# Patient Record
Sex: MALE | Race: WHITE | Employment: OTHER | URBAN - NONMETROPOLITAN AREA
[De-identification: names, ages, dates, MRNs, and addresses within clinical notes are randomized per-mention and may not be internally consistent; named-entity substitution may affect disease eponyms.]

---

## 2019-09-16 ENCOUNTER — HOSPITAL ENCOUNTER (EMERGENCY)
Age: 69
Discharge: HOME | End: 2019-09-16
Payer: MEDICARE

## 2019-09-16 ENCOUNTER — APPOINTMENT (OUTPATIENT)
Dept: CT IMAGING | Age: 69
End: 2019-09-16
Payer: MEDICARE

## 2019-09-16 VITALS
TEMPERATURE: 98.3 F | HEIGHT: 69 IN | HEART RATE: 74 BPM | WEIGHT: 170 LBS | OXYGEN SATURATION: 97 % | SYSTOLIC BLOOD PRESSURE: 162 MMHG | RESPIRATION RATE: 17 BRPM | DIASTOLIC BLOOD PRESSURE: 95 MMHG | BODY MASS INDEX: 25.18 KG/M2

## 2019-09-16 DIAGNOSIS — K04.7 DENTAL ABSCESS: Primary | ICD-10-CM

## 2019-09-16 DIAGNOSIS — E87.1 HYPONATREMIA: ICD-10-CM

## 2019-09-16 LAB
ANION GAP SERPL CALCULATED.3IONS-SCNC: 16 MEQ/L (ref 8–16)
BASOPHILS # BLD: 0.5 %
BASOPHILS ABSOLUTE: 0 THOU/MM3 (ref 0–0.1)
BUN BLDV-MCNC: 10 MG/DL (ref 7–22)
CALCIUM SERPL-MCNC: 9.8 MG/DL (ref 8.5–10.5)
CHLORIDE BLD-SCNC: 87 MEQ/L (ref 98–111)
CO2: 26 MEQ/L (ref 23–33)
CREAT SERPL-MCNC: 0.9 MG/DL (ref 0.4–1.2)
EOSINOPHIL # BLD: 0.2 %
EOSINOPHILS ABSOLUTE: 0 THOU/MM3 (ref 0–0.4)
ERYTHROCYTE [DISTWIDTH] IN BLOOD BY AUTOMATED COUNT: 12.4 % (ref 11.5–14.5)
ERYTHROCYTE [DISTWIDTH] IN BLOOD BY AUTOMATED COUNT: 43.5 FL (ref 35–45)
GFR SERPL CREATININE-BSD FRML MDRD: 83 ML/MIN/1.73M2
GLUCOSE BLD-MCNC: 109 MG/DL (ref 70–108)
HCT VFR BLD CALC: 42 % (ref 42–52)
HEMOGLOBIN: 14.6 GM/DL (ref 14–18)
IMMATURE GRANS (ABS): 0.11 THOU/MM3 (ref 0–0.07)
IMMATURE GRANULOCYTES: 1 %
LYMPHOCYTES # BLD: 15.1 %
LYMPHOCYTES ABSOLUTE: 1.5 THOU/MM3 (ref 1–4.8)
MCH RBC QN AUTO: 33.4 PG (ref 26–33)
MCHC RBC AUTO-ENTMCNC: 34.8 GM/DL (ref 32.2–35.5)
MCV RBC AUTO: 96.1 FL (ref 80–94)
MONOCYTES # BLD: 7.8 %
MONOCYTES ABSOLUTE: 0.8 THOU/MM3 (ref 0.4–1.3)
NUCLEATED RED BLOOD CELLS: 0 /100 WBC
OSMOLALITY CALCULATION: 258.6 MOSMOL/KG (ref 275–300)
PLATELET # BLD: 426 THOU/MM3 (ref 130–400)
PMV BLD AUTO: 8.2 FL (ref 9.4–12.4)
POTASSIUM SERPL-SCNC: 3.4 MEQ/L (ref 3.5–5.2)
RBC # BLD: 4.37 MILL/MM3 (ref 4.7–6.1)
SEG NEUTROPHILS: 75.3 %
SEGMENTED NEUTROPHILS ABSOLUTE COUNT: 7.5 THOU/MM3 (ref 1.8–7.7)
SODIUM BLD-SCNC: 129 MEQ/L (ref 135–145)
WBC # BLD: 9.9 THOU/MM3 (ref 4.8–10.8)

## 2019-09-16 PROCEDURE — 70487 CT MAXILLOFACIAL W/DYE: CPT

## 2019-09-16 PROCEDURE — 36415 COLL VENOUS BLD VENIPUNCTURE: CPT

## 2019-09-16 PROCEDURE — 80048 BASIC METABOLIC PNL TOTAL CA: CPT

## 2019-09-16 PROCEDURE — 99284 EMERGENCY DEPT VISIT MOD MDM: CPT

## 2019-09-16 PROCEDURE — 2580000003 HC RX 258: Performed by: PHYSICIAN ASSISTANT

## 2019-09-16 PROCEDURE — 2500000003 HC RX 250 WO HCPCS: Performed by: PHYSICIAN ASSISTANT

## 2019-09-16 PROCEDURE — 87205 SMEAR GRAM STAIN: CPT

## 2019-09-16 PROCEDURE — 6360000004 HC RX CONTRAST MEDICATION: Performed by: PHYSICIAN ASSISTANT

## 2019-09-16 PROCEDURE — 87040 BLOOD CULTURE FOR BACTERIA: CPT

## 2019-09-16 PROCEDURE — 87070 CULTURE OTHR SPECIMN AEROBIC: CPT

## 2019-09-16 PROCEDURE — 6370000000 HC RX 637 (ALT 250 FOR IP): Performed by: PHYSICIAN ASSISTANT

## 2019-09-16 PROCEDURE — 85025 COMPLETE CBC W/AUTO DIFF WBC: CPT

## 2019-09-16 PROCEDURE — 96365 THER/PROPH/DIAG IV INF INIT: CPT

## 2019-09-16 PROCEDURE — 87075 CULTR BACTERIA EXCEPT BLOOD: CPT

## 2019-09-16 RX ORDER — CHLORHEXIDINE GLUCONATE 0.12 MG/ML
15 RINSE ORAL 2 TIMES DAILY
Qty: 420 ML | Refills: 0 | Status: SHIPPED | OUTPATIENT
Start: 2019-09-16 | End: 2019-09-30

## 2019-09-16 RX ORDER — CLINDAMYCIN PHOSPHATE 600 MG/50ML
600 INJECTION INTRAVENOUS ONCE
Status: COMPLETED | OUTPATIENT
Start: 2019-09-16 | End: 2019-09-16

## 2019-09-16 RX ORDER — HYDROCODONE BITARTRATE AND ACETAMINOPHEN 5; 325 MG/1; MG/1
1 TABLET ORAL EVERY 6 HOURS PRN
Qty: 8 TABLET | Refills: 0 | Status: SHIPPED | OUTPATIENT
Start: 2019-09-16 | End: 2019-09-18

## 2019-09-16 RX ORDER — CLINDAMYCIN HYDROCHLORIDE 150 MG/1
450 CAPSULE ORAL 3 TIMES DAILY
Qty: 90 CAPSULE | Refills: 0 | Status: SHIPPED | OUTPATIENT
Start: 2019-09-16 | End: 2019-09-26

## 2019-09-16 RX ORDER — HYDROCODONE BITARTRATE AND ACETAMINOPHEN 5; 325 MG/1; MG/1
1 TABLET ORAL ONCE
Status: COMPLETED | OUTPATIENT
Start: 2019-09-16 | End: 2019-09-16

## 2019-09-16 RX ORDER — 0.9 % SODIUM CHLORIDE 0.9 %
500 INTRAVENOUS SOLUTION INTRAVENOUS ONCE
Status: COMPLETED | OUTPATIENT
Start: 2019-09-16 | End: 2019-09-16

## 2019-09-16 RX ADMIN — HYDROCODONE BITARTRATE AND ACETAMINOPHEN 1 TABLET: 5; 325 TABLET ORAL at 13:15

## 2019-09-16 RX ADMIN — IOPAMIDOL 80 ML: 755 INJECTION, SOLUTION INTRAVENOUS at 13:59

## 2019-09-16 RX ADMIN — CLINDAMYCIN PHOSPHATE 600 MG: 600 INJECTION, SOLUTION INTRAVENOUS at 15:43

## 2019-09-16 RX ADMIN — SODIUM CHLORIDE 500 ML: 9 INJECTION, SOLUTION INTRAVENOUS at 13:20

## 2019-09-16 ASSESSMENT — PAIN SCALES - GENERAL
PAINLEVEL_OUTOF10: 10
PAINLEVEL_OUTOF10: 10
PAINLEVEL_OUTOF10: 7

## 2019-09-16 ASSESSMENT — PAIN SCALES - WONG BAKER: WONGBAKER_NUMERICALRESPONSE: 10

## 2019-09-16 ASSESSMENT — PAIN DESCRIPTION - ORIENTATION
ORIENTATION: RIGHT
ORIENTATION: RIGHT

## 2019-09-16 ASSESSMENT — PAIN DESCRIPTION - DESCRIPTORS
DESCRIPTORS: THROBBING
DESCRIPTORS: THROBBING

## 2019-09-16 ASSESSMENT — PAIN DESCRIPTION - PAIN TYPE
TYPE: ACUTE PAIN
TYPE: ACUTE PAIN

## 2019-09-16 ASSESSMENT — PAIN DESCRIPTION - ONSET: ONSET: PROGRESSIVE

## 2019-09-16 ASSESSMENT — PAIN DESCRIPTION - LOCATION
LOCATION: TEETH
LOCATION: TEETH;MOUTH

## 2019-09-16 ASSESSMENT — ENCOUNTER SYMPTOMS
VOMITING: 0
NAUSEA: 0
SHORTNESS OF BREATH: 0
VOICE CHANGE: 0
CHEST TIGHTNESS: 0

## 2019-09-16 ASSESSMENT — PAIN DESCRIPTION - FREQUENCY
FREQUENCY: CONTINUOUS
FREQUENCY: CONTINUOUS

## 2019-09-16 ASSESSMENT — PAIN DESCRIPTION - PROGRESSION: CLINICAL_PROGRESSION: GRADUALLY WORSENING

## 2019-09-16 NOTE — ED PROVIDER NOTES
Discharge Medication List as of 9/16/2019  4:26 PM          ALLERGIES     is allergic to penicillins. FAMILY HISTORY     has no family status information on file. family history is not on file. SOCIAL HISTORY      reports that he has never smoked. He has never used smokeless tobacco. He reports that he drinks about 4.0 - 6.0 standard drinks of alcohol per week. He reports that he has current or past drug history. Drug: Marijuana. PHYSICAL EXAM     INITIAL VITALS:  height is 5' 9\" (1.753 m) and weight is 170 lb (77.1 kg). His oral temperature is 98.3 °F (36.8 °C). His blood pressure is 162/95 (abnormal) and his pulse is 74. His respiration is 17 and oxygen saturation is 97%. Physical Exam   Constitutional: He is oriented to person, place, and time. He appears well-developed and well-nourished. No distress. HENT:   Head: Normocephalic and atraumatic. Right Ear: External ear normal.   Left Ear: External ear normal.   Mouth/Throat: Oropharynx is clear and moist. He has dentures. No trismus in the jaw. Dental abscesses and dental caries present. Patients has only two permanant teeth remaining on the right lower jaw that parallel an open pocket with purulent drainage. There is a foul smell but no obvious tenderness to palpation of the jawline. No submandibular tenderness or swelling. Eyes: Pupils are equal, round, and reactive to light. Conjunctivae and EOM are normal.   Neck: Normal range of motion. No spinous process tenderness and no muscular tenderness present. Cardiovascular: Normal rate, regular rhythm and normal heart sounds. Pulmonary/Chest: Effort normal and breath sounds normal.   Neurological: He is alert and oriented to person, place, and time. Skin: Skin is warm and dry. He is not diaphoretic. Psychiatric: He has a normal mood and affect. His behavior is normal. Judgment and thought content normal.   Nursing note and vitals reviewed.       DIFFERENTIAL DIAGNOSIS: Differential diagnoses are discussed    DIAGNOSTIC RESULTS     EKG: All EKG's are interpreted by the Emergency Department Physician who either signs or Co-signsthis chart in the absence of a cardiologist.        RADIOLOGY: non-plain film images(s) such as CT, Ultrasound and MRI are read by the radiologist.    Saturnino Lund   Final Result   1. Patient is almost totally edentulous. 2. Findings of dental abscesses involving right and left hemimandible anteriorly with adjacent soft tissue swelling, likely cellulitis/phlegmon. A well-defined or drainable soft tissue abscess is not identified            **This report has been created using voice recognition software. It may contain minor errors which are inherent in voice recognition technology. **      Final report electronically signed by Dr. Courtney Davila on 9/16/2019 2:15 PM          LABS:      Labs Reviewed   CBC WITH AUTO DIFFERENTIAL - Abnormal; Notable for the following components:       Result Value    RBC 4.37 (*)     MCV 96.1 (*)     MCH 33.4 (*)     Platelets 977 (*)     MPV 8.2 (*)     Immature Grans (Abs) 0.11 (*)     All other components within normal limits   BASIC METABOLIC PANEL - Abnormal; Notable for the following components:    Sodium 129 (*)     Potassium 3.4 (*)     Chloride 87 (*)     Glucose 109 (*)     All other components within normal limits   GLOMERULAR FILTRATION RATE, ESTIMATED - Abnormal; Notable for the following components:    Est, Glom Filt Rate 83 (*)     All other components within normal limits   OSMOLALITY - Abnormal; Notable for the following components:    Osmolality Calc 258.6 (*)     All other components within normal limits   ANAEROBIC AND AEROBIC CULTURE    Narrative:     Source: mouth       Site: swab          Current Antibiotics: none   CULTURE BLOOD #1   CULTURE BLOOD #2   ANION GAP       EMERGENCY DEPARTMENT COURSE:   Vitals:    Vitals:    09/16/19 1209 09/16/19 1321 09/16/19 1530   BP: (!) 161/85 (!) 166/97 (!) 162/95   Pulse: 83 72 74   Resp:  18 17   Temp: 98.3 °F (36.8 °C)     TempSrc: Oral     SpO2:  97% 97%   Weight: 170 lb (77.1 kg)     Height: 5' 9\" (1.753 m)        12:42 PM: The patient was seen and evaluated. Patient presents after referral from his dentist for oral injury and infection. He reports fall 3 days ago and pain since. Denies any systemic symptoms. He reports improvement in pain after Norco in the department. Labs are reassuring. Exam is consistent with ruptured abscess, CT ordered to rule out any associated dental fracture. This reveals that the patient has dental abscess involving the right and left hemimandible anteriorly with adjacent soft tissue swelling, likely cellulitis/phlegmon. No well-defined drainable soft tissue abscess identified. He was given IV clindamycin in the department. Labs did reveal some hyponatremia. After discussion with the patient, he does typically try to avoid salt. He complains of no symptoms associated with hyponatremia. He will increase his p.o. salt intake and agrees to follow with PCP for monitoring of the sodium level. Results discussed with patient. He has no oral pharyngeal compromise and airway is patent. No systemic symptoms. I feel he is stable for discharge and outpatient follow-up. Return precautions discussed. Dietary adjustments for the open mucosal injury discussed, I do not feel that surgical repair is indicated 3 days out from the injury and with active purulent drainage. Discussed with attending provider who agrees. CRITICAL CARE:   None    CONSULTS:  None    PROCEDURES:  None    FINAL IMPRESSION      1. Dental abscess    2. Hyponatremia          DISPOSITION/PLAN   Discharge    PATIENT REFERRED TO:  [id number] [place] [address].   [place] [address]  [phone]  Call in 1 day      Your dentist    In 1 week      [facility] [address] [address] EMERGENCY DEPT  [address] [place] [address]  [phone]    If symptoms worsen      DISCHARGEMEDICATIONS:  Discharge Medication List as of 9/16/2019  4:26 PM      START taking these medications    Details   clindamycin (CLEOCIN) 150 MG capsule Take 3 capsules by mouth 3 times daily for 10 days, Disp-90 capsule, R-0Print      chlorhexidine (PERIDEX) 0.12 % solution Take 15 mLs by mouth 2 times daily for 14 days, Disp-420 mL, R-0Print      HYDROcodone-acetaminophen (NORCO) 5-325 MG per tablet Take 1 tablet by mouth every 6 hours as needed for Pain for up to 2 days. WARNING: This medication may make you drowsy.  Do not operate heavy machinery or motor vehicles during use., Disp-8 tablet, R-0Print             (Please note that portions of this note were completedwith a voice recognition program.  Efforts were made to edit the dictations but occasionally words are mis-transcribed.)        Kaykay Koch PA-C  09/16/19 Jaqui Leger, JAYA  09/16/19 [id number]

## 2019-09-19 LAB
AEROBIC CULTURE: NORMAL
ANAEROBIC CULTURE: NORMAL
GRAM STAIN RESULT: NORMAL

## 2019-09-22 LAB
BLOOD CULTURE, ROUTINE: NORMAL
BLOOD CULTURE, ROUTINE: NORMAL

## 2023-01-18 ENCOUNTER — HOSPITAL ENCOUNTER (INPATIENT)
Age: 73
LOS: 2 days | Discharge: HOME | DRG: 812 | End: 2023-01-20
Attending: STUDENT IN AN ORGANIZED HEALTH CARE EDUCATION/TRAINING PROGRAM | Admitting: STUDENT IN AN ORGANIZED HEALTH CARE EDUCATION/TRAINING PROGRAM
Payer: MEDICAID

## 2023-01-18 ENCOUNTER — APPOINTMENT (OUTPATIENT)
Dept: GENERAL RADIOLOGY | Age: 73
DRG: 812 | End: 2023-01-18
Payer: MEDICAID

## 2023-01-18 DIAGNOSIS — D64.9 NORMOCYTIC ANEMIA: Primary | ICD-10-CM

## 2023-01-18 DIAGNOSIS — E61.1 IRON DEFICIENCY: ICD-10-CM

## 2023-01-18 DIAGNOSIS — D64.9 SYMPTOMATIC ANEMIA: ICD-10-CM

## 2023-01-18 LAB
ABO: NORMAL
ABSOLUTE RETIC #: 46 THOU/MM3 (ref 20–115)
ANION GAP SERPL CALCULATED.3IONS-SCNC: 16 MEQ/L (ref 8–16)
ANTIBODY SCREEN: NORMAL
BACTERIA: NORMAL
BILIRUBIN URINE: NEGATIVE
BLOOD, URINE: NEGATIVE
BUN BLDV-MCNC: 9 MG/DL (ref 7–22)
CALCIUM SERPL-MCNC: 8.3 MG/DL (ref 8.5–10.5)
CASTS: NORMAL /LPF
CASTS: NORMAL /LPF
CHARACTER, URINE: CLEAR
CHLORIDE BLD-SCNC: 96 MEQ/L (ref 98–111)
CO2: 16 MEQ/L (ref 23–33)
COLOR: YELLOW
CREAT SERPL-MCNC: 1 MG/DL (ref 0.4–1.2)
CRYSTALS: NORMAL
EKG ATRIAL RATE: 93 BPM
EKG P AXIS: 52 DEGREES
EKG P-R INTERVAL: 184 MS
EKG Q-T INTERVAL: 350 MS
EKG QRS DURATION: 76 MS
EKG QTC CALCULATION (BAZETT): 435 MS
EKG R AXIS: 72 DEGREES
EKG T AXIS: 70 DEGREES
EKG VENTRICULAR RATE: 93 BPM
EPITHELIAL CELLS, UA: NORMAL /HPF
FERRITIN: 6 NG/ML (ref 22–322)
FOLATE: > 20 NG/ML (ref 4.8–24.2)
GFR SERPL CREATININE-BSD FRML MDRD: > 60 ML/MIN/1.73M2
GLUCOSE BLD-MCNC: 120 MG/DL (ref 70–108)
GLUCOSE, URINE: NEGATIVE MG/DL
HCT VFR BLD CALC: 14.5 % (ref 42–52)
HEMOCCULT STL QL: NEGATIVE
HEMOGLOBIN: 4.6 GM/DL (ref 14–18)
IMMATURE RETIC FRACT: 18.5 % (ref 2.3–13.4)
INFLUENZA A: NOT DETECTED
INFLUENZA B: NOT DETECTED
IRON SATURATION: 2 % (ref 20–50)
IRON: 8 UG/DL (ref 65–195)
KETONES, URINE: NEGATIVE
LD: 127 U/L (ref 100–190)
LEUKOCYTE ESTERASE, URINE: NEGATIVE
MISCELLANEOUS LAB TEST RESULT: NORMAL
NITRITE, URINE: NEGATIVE
OSMOLALITY CALCULATION: 257 MOSMOL/KG (ref 275–300)
PH UA: 6 (ref 5–9)
POTASSIUM SERPL-SCNC: 4 MEQ/L (ref 3.5–5.2)
PRO-BNP: 107.9 PG/ML (ref 0–124)
PROTEIN UA: NEGATIVE MG/DL
RBC URINE: NORMAL /HPF
RENAL EPITHELIAL, UA: NORMAL
RETIC HEMOGLOBIN: 19.6 PG (ref 28.2–35.7)
RETICULOCYTE ABSOLUTE COUNT: 2.5 % (ref 0.5–2)
RH FACTOR: NORMAL
SARS-COV-2 RNA, RT PCR: NOT DETECTED
SCAN OF BLOOD SMEAR: NORMAL
SODIUM BLD-SCNC: 128 MEQ/L (ref 135–145)
SODIUM URINE: 62 MEQ/L
SPECIFIC GRAVITY UA: 1.01 (ref 1–1.03)
TOTAL IRON BINDING CAPACITY: 393 UG/DL (ref 171–450)
TROPONIN T: < 0.01 NG/ML
UROBILINOGEN, URINE: 0.2 EU/DL (ref 0–1)
VITAMIN B-12: 639 PG/ML (ref 211–911)
WBC UA: NORMAL /HPF
YEAST: NORMAL

## 2023-01-18 PROCEDURE — 86850 RBC ANTIBODY SCREEN: CPT

## 2023-01-18 PROCEDURE — 6370000000 HC RX 637 (ALT 250 FOR IP): Performed by: PHYSICIAN ASSISTANT

## 2023-01-18 PROCEDURE — 2580000003 HC RX 258: Performed by: PHYSICIAN ASSISTANT

## 2023-01-18 PROCEDURE — 87641 MR-STAPH DNA AMP PROBE: CPT

## 2023-01-18 PROCEDURE — 81001 URINALYSIS AUTO W/SCOPE: CPT

## 2023-01-18 PROCEDURE — 85025 COMPLETE CBC W/AUTO DIFF WBC: CPT

## 2023-01-18 PROCEDURE — 84300 ASSAY OF URINE SODIUM: CPT

## 2023-01-18 PROCEDURE — 85046 RETICYTE/HGB CONCENTRATE: CPT

## 2023-01-18 PROCEDURE — 82728 ASSAY OF FERRITIN: CPT

## 2023-01-18 PROCEDURE — 93005 ELECTROCARDIOGRAM TRACING: CPT | Performed by: STUDENT IN AN ORGANIZED HEALTH CARE EDUCATION/TRAINING PROGRAM

## 2023-01-18 PROCEDURE — 71046 X-RAY EXAM CHEST 2 VIEWS: CPT

## 2023-01-18 PROCEDURE — 83935 ASSAY OF URINE OSMOLALITY: CPT

## 2023-01-18 PROCEDURE — 99223 1ST HOSP IP/OBS HIGH 75: CPT | Performed by: PHYSICIAN ASSISTANT

## 2023-01-18 PROCEDURE — G0378 HOSPITAL OBSERVATION PER HR: HCPCS

## 2023-01-18 PROCEDURE — 80048 BASIC METABOLIC PNL TOTAL CA: CPT

## 2023-01-18 PROCEDURE — 84484 ASSAY OF TROPONIN QUANT: CPT

## 2023-01-18 PROCEDURE — 85018 HEMOGLOBIN: CPT

## 2023-01-18 PROCEDURE — 93010 ELECTROCARDIOGRAM REPORT: CPT | Performed by: INTERNAL MEDICINE

## 2023-01-18 PROCEDURE — 82272 OCCULT BLD FECES 1-3 TESTS: CPT

## 2023-01-18 PROCEDURE — 86923 COMPATIBILITY TEST ELECTRIC: CPT

## 2023-01-18 PROCEDURE — 82607 VITAMIN B-12: CPT

## 2023-01-18 PROCEDURE — 83615 LACTATE (LD) (LDH) ENZYME: CPT

## 2023-01-18 PROCEDURE — 83540 ASSAY OF IRON: CPT

## 2023-01-18 PROCEDURE — 82746 ASSAY OF FOLIC ACID SERUM: CPT

## 2023-01-18 PROCEDURE — 83880 ASSAY OF NATRIURETIC PEPTIDE: CPT

## 2023-01-18 PROCEDURE — 87500 VANOMYCIN DNA AMP PROBE: CPT

## 2023-01-18 PROCEDURE — 99285 EMERGENCY DEPT VISIT HI MDM: CPT

## 2023-01-18 PROCEDURE — 83550 IRON BINDING TEST: CPT

## 2023-01-18 PROCEDURE — 86901 BLOOD TYPING SEROLOGIC RH(D): CPT

## 2023-01-18 PROCEDURE — 87636 SARSCOV2 & INF A&B AMP PRB: CPT

## 2023-01-18 PROCEDURE — P9016 RBC LEUKOCYTES REDUCED: HCPCS

## 2023-01-18 PROCEDURE — 2060000000 HC ICU INTERMEDIATE R&B

## 2023-01-18 PROCEDURE — 36430 TRANSFUSION BLD/BLD COMPNT: CPT

## 2023-01-18 PROCEDURE — 85014 HEMATOCRIT: CPT

## 2023-01-18 PROCEDURE — 96365 THER/PROPH/DIAG IV INF INIT: CPT

## 2023-01-18 PROCEDURE — 86900 BLOOD TYPING SEROLOGIC ABO: CPT

## 2023-01-18 PROCEDURE — 36415 COLL VENOUS BLD VENIPUNCTURE: CPT

## 2023-01-18 PROCEDURE — 6360000002 HC RX W HCPCS: Performed by: PHYSICIAN ASSISTANT

## 2023-01-18 RX ORDER — SODIUM CHLORIDE 0.9 % (FLUSH) 0.9 %
5-40 SYRINGE (ML) INJECTION EVERY 12 HOURS SCHEDULED
Status: DISCONTINUED | OUTPATIENT
Start: 2023-01-18 | End: 2023-01-20 | Stop reason: HOSPADM

## 2023-01-18 RX ORDER — PANTOPRAZOLE SODIUM 40 MG/1
40 TABLET, DELAYED RELEASE ORAL
Status: DISCONTINUED | OUTPATIENT
Start: 2023-01-19 | End: 2023-01-20 | Stop reason: HOSPADM

## 2023-01-18 RX ORDER — ATORVASTATIN CALCIUM 20 MG/1
20 TABLET, FILM COATED ORAL NIGHTLY
Status: DISCONTINUED | OUTPATIENT
Start: 2023-01-19 | End: 2023-01-20 | Stop reason: HOSPADM

## 2023-01-18 RX ORDER — SODIUM CHLORIDE 9 MG/ML
INJECTION, SOLUTION INTRAVENOUS PRN
Status: DISCONTINUED | OUTPATIENT
Start: 2023-01-18 | End: 2023-01-20 | Stop reason: HOSPADM

## 2023-01-18 RX ORDER — ACETAMINOPHEN 650 MG/1
650 SUPPOSITORY RECTAL EVERY 6 HOURS PRN
Status: DISCONTINUED | OUTPATIENT
Start: 2023-01-18 | End: 2023-01-20 | Stop reason: HOSPADM

## 2023-01-18 RX ORDER — BRIMONIDINE TARTRATE 2 MG/ML
1 SOLUTION/ DROPS OPHTHALMIC 2 TIMES DAILY
Status: DISCONTINUED | OUTPATIENT
Start: 2023-01-18 | End: 2023-01-20 | Stop reason: HOSPADM

## 2023-01-18 RX ORDER — SODIUM CHLORIDE 0.9 % (FLUSH) 0.9 %
5-40 SYRINGE (ML) INJECTION PRN
Status: DISCONTINUED | OUTPATIENT
Start: 2023-01-18 | End: 2023-01-20 | Stop reason: HOSPADM

## 2023-01-18 RX ORDER — ONDANSETRON 4 MG/1
4 TABLET, ORALLY DISINTEGRATING ORAL EVERY 8 HOURS PRN
Status: DISCONTINUED | OUTPATIENT
Start: 2023-01-18 | End: 2023-01-20 | Stop reason: HOSPADM

## 2023-01-18 RX ORDER — POLYETHYLENE GLYCOL 3350 17 G/17G
17 POWDER, FOR SOLUTION ORAL DAILY PRN
Status: DISCONTINUED | OUTPATIENT
Start: 2023-01-18 | End: 2023-01-20 | Stop reason: HOSPADM

## 2023-01-18 RX ORDER — LISINOPRIL 20 MG/1
20 TABLET ORAL DAILY
Status: DISCONTINUED | OUTPATIENT
Start: 2023-01-19 | End: 2023-01-20 | Stop reason: HOSPADM

## 2023-01-18 RX ORDER — LATANOPROST 50 UG/ML
1 SOLUTION/ DROPS OPHTHALMIC NIGHTLY
Status: DISCONTINUED | OUTPATIENT
Start: 2023-01-18 | End: 2023-01-20 | Stop reason: HOSPADM

## 2023-01-18 RX ORDER — ACETAMINOPHEN 325 MG/1
650 TABLET ORAL EVERY 6 HOURS PRN
Status: DISCONTINUED | OUTPATIENT
Start: 2023-01-18 | End: 2023-01-20 | Stop reason: HOSPADM

## 2023-01-18 RX ORDER — ONDANSETRON 2 MG/ML
4 INJECTION INTRAMUSCULAR; INTRAVENOUS EVERY 6 HOURS PRN
Status: DISCONTINUED | OUTPATIENT
Start: 2023-01-18 | End: 2023-01-20 | Stop reason: HOSPADM

## 2023-01-18 RX ADMIN — IRON SUCROSE 200 MG: 20 INJECTION, SOLUTION INTRAVENOUS at 20:03

## 2023-01-18 RX ADMIN — SODIUM CHLORIDE, PRESERVATIVE FREE 10 ML: 5 INJECTION INTRAVENOUS at 20:59

## 2023-01-18 RX ADMIN — LATANOPROST 1 DROP: 50 SOLUTION OPHTHALMIC at 21:06

## 2023-01-18 RX ADMIN — SODIUM CHLORIDE, PRESERVATIVE FREE 10 ML: 5 INJECTION INTRAVENOUS at 20:58

## 2023-01-18 ASSESSMENT — PAIN SCALES - GENERAL
PAINLEVEL_OUTOF10: 0
PAINLEVEL_OUTOF10: 0

## 2023-01-18 ASSESSMENT — PAIN - FUNCTIONAL ASSESSMENT: PAIN_FUNCTIONAL_ASSESSMENT: NONE - DENIES PAIN

## 2023-01-18 NOTE — ED NOTES
This RN monitored patient for the first 15 minutes of the blood transfusion. Pt denies pain at this time. No signs or symptoms of blood transfusion reaction at this time. vss.      Ivan Matthew RN  01/18/23 5014

## 2023-01-18 NOTE — ED NOTES
ED nurse-to-nurse bedside report    Chief Complaint   Patient presents with    Shortness of Breath      LOC: alert and orientated to name, place, date  Vital signs   Vitals:    01/18/23 1733 01/18/23 1738 01/18/23 1743 01/18/23 1748   BP: 122/68 129/87 138/71 (!) 141/70   Pulse: 86 96 86 89   Resp: 25 19 15 15   Temp: 98.1 °F (36.7 °C)  98.7 °F (37.1 °C) 98.1 °F (36.7 °C)   TempSrc: Oral  Oral Oral   SpO2: 100% 100% 100% 99%   Weight:       Height:          Pain:    Pain Interventions: none  Pain Goal: 0  Oxygen: No    Current needs required room air   Telemetry: Yes  LDAs:   Peripheral IV 01/18/23 Right Antecubital (Active)   Site Assessment Clean, dry & intact; Clean;Dry; Intact 01/18/23 1446   Line Status Blood return noted;Brisk blood return;Flushed 01/18/23 1446     Continuous Infusions:    sodium chloride      sodium chloride       Mobility: Independent  Briones Fall Risk Score: No flowsheet data found.   Fall Interventions: side rails and call light         Mar Juarez RN  01/18/23 4899

## 2023-01-18 NOTE — H&P
Hospitalist History & Physical    Patient:  Shamar Tellez    Unit/Bed:17/017A  YOB: 1950  MRN: [id number]   Acct: [de-identified]   PCP: No primary care provider on file. Code Status: No Order    Date of Service: Pt seen/examined on 01/18/23 and admitted to Inpatient with expected LOS greater than two midnights due to medical therapy. Chief Complaint: Shortness of breath    Assessment/Plan:    Symptomatic iron deficiency anemia  Hemoglobin on admission 5.1. Iron 8, iron saturation 3%, TIBC within normal limits, ferritin 6, corrected reticulocyte count 0.44%  1 unit transfused in ER. Additional unit ordered. Repeat H/H following transfusion. Transfuse for hemoglobin less than 7.0 or hemodynamic instability. CBC in the morning. Outpatient follow up with GI  Start IV iron x3 doses  Hypotonic hyponatremia  Check urine sodium, urine osmolality  Recheck BMP in AM  Leukocytosis  No signs/symptoms of infection. Afebrile. Likely reactive secondary to severe anemia. CBC in the morning. Essential hypertension  Continue lisinopril 20 mg daily  Hyperlipidemia  Continue Lipitor 20 mg daily  Glaucoma  Brimonidine in the morning. Latanoprost in the evening. Gastroesophageal reflux disease  Protonix    History of Present Illness:  Shamar Tellez is a 67 y.o. male with a history of colon polyps, essential hypertension, hyperlipidemia, glaucoma, and gastroesophageal reflux disease who presented to [facility] with chief complaint of shortness of breath. The patient states for approximately the last year he has become more fatigued and short of breath, primarily with exertion. Over the last few days, this has increased significantly. The patient consulted with his PCP who recommended he come to the emergency department for further evaluation. On arrival to the emergency department, his hemoglobin was noted to be 5.1. The patient has no history of anemia.   He has no family history of any blood disorders or malignancy. He denies any overt signs of bleeding including hematuria, hematochezia, or melena. He eats a normal diet and has no history of iron deficiency. His last colonoscopy was 3 years ago and was noted to be normal.  He did have colon polyps on a prior colonoscopy approximately 3 years prior. He denies any chest pain, cough, abdominal pain, nausea, vomiting, or diarrhea. He does endorse some intermittent constipation. No weight loss, fevers, chills, or night sweats. He does not use tobacco, but does report significant alcohol use, but states he has not drank since before the new year. Previously, he was drinking a small pint of liquor at a time. He does use marijuana. No occupational exposures to any heavy metals or chemicals. Review of Systems: Pertinent positives as noted in the HPI. All other systems reviewed and negative. Past Medical History:        Diagnosis Date    Hyperlipidemia     Hypertension        Past Surgical History:    No past surgical history on file. Home Medications:   No current facility-administered medications on file prior to encounter. No current outpatient medications on file prior to encounter. Allergies:    Penicillins    Social History:    reports that he has never smoked. He has never used smokeless tobacco. He reports current alcohol use of about 4.0 - 6.0 standard drinks per week. He reports current drug use. Drug: Marijuana Jersimone November). Family History:   No family history on file. Diet:  No diet orders on file      Physical Exam:  BP (!) 141/70   Pulse 89   Temp 98.1 °F (36.7 °C) (Oral)   Resp 15   Ht 5' 9\" (1.753 m)   Wt 160 lb (72.6 kg)   SpO2 99%   BMI 23.63 kg/m²   General: Alert, in no acute distress, cooperative  HEENT:  Normocephalic and atraumatic. Conjunctivae pale. Pupils equal, round, and reactive to light. External nares without deformity. External ears normal.  Neck: Supple. No JVD. No thyromegaly.   No cervical lymphadenopathy. Lungs: On room air. Respiratory rate and effort normal.  Lungs clear to auscultation bilaterally. Cardiac: Regular rate and rhythm. No murmur, rubs, or gallops. Abdomen: Nondistended, soft, no tenderness to palpation, guarding, rigidity. Bowel sounds normoactive. Extremities:  No clubbing, cyanosis, or lower extremity edema. Vasculature: capillary refill < 3 seconds. Lower extremity pulses 2+ bilaterally  Skin:  Warm and dry. No rashes or lesions. Psych: Mood normal.  Affect appropriate. Neurologic: Alert and oriented x4. No cranial nerve deficits. No extremity weakness. Data: (All radiographs, tracings, PFTs, and imaging are personally viewed and interpreted unless otherwise noted)  Labs:   Recent Labs     01/18/23  1445 01/18/23  1632   WBC 13.7*  --    HGB 5.1* 4.6*   HCT 15.7* 14.5*   *  --      Recent Labs     01/18/23  1445   *   K 4.0   CL 96*   CO2 16*   BUN 9   CREATININE 1.0   CALCIUM 8.3*     No results for input(s): AST, ALT, BILIDIR, BILITOT, ALKPHOS in the last 72 hours. No results for input(s): INR in the last 72 hours. No results for input(s): Joeline Reeks in the last 72 hours. Urinalysis:   No results found for: NITRU, WBCUA, BACTERIA, RBCUA, BLOODU, SPECGRAV, GLUCOSEU    EKG: normal sinus rhythm, no blocks or conduction defects, no ischemic changes    Radiology:  XR CHEST (2 VW)    Result Date: 1/18/2023  PROCEDURE: XR CHEST (2 VW) CLINICAL INFORMATION: shortness of breath. Shortness of breath and right shoulder pain for 4 days. COMPARISON: No prior study. TECHNIQUE: PA and lateral views the chest. FINDINGS: There is hyperaeration as evidence for COPD. There is a calcified nodule in the left lower lobe as evidence for old granulomatous disease. The lungs otherwise appear clear. The pulmonary vasculature and cardiomediastinal silhouette are within normal limits. There are mild degenerative changes of the spine.      No acute abnormality identified. **This report has been created using voice recognition software. It may contain minor errors which are inherent in voice recognition technology. ** Final report electronically signed by Dr. Pat Moctezuma MD on 1/18/2023 3:14 PM        Tele:   [x] yes             [] no      Thank you No primary care provider on file. for the opportunity to be involved in this patient's care.     Electronically signed by Wu Bledsoe PA-C on 1/18/2023 at 6:10 PM

## 2023-01-18 NOTE — CARE COORDINATION
Spoke with patient and step son bedside. Patient advised does not have ACP documents at home and would like to complete this visit because wants DPOA to be step son. Denied concerns or questions.

## 2023-01-18 NOTE — CONSENT
Informed Consent for Blood Component Transfusion Note    I have discussed with the patient the rationale for blood component transfusion; its benefits in treating or preventing fatigue, organ damage, or death; and its risk which includes mild transfusion reactions, rare risk of blood borne infection, or more serious but rare reactions. I have discussed the alternatives to transfusion, including the risk and consequences of not receiving transfusion. The patient had an opportunity to ask questions and had agreed to proceed with transfusion of blood components.     Electronically signed by Claire Spears PA-C on 1/18/23 at 5:11 PM EST

## 2023-01-18 NOTE — ED NOTES
Blood transfusion complete at this time. No signs or symptoms of blood transfusion reaction at this time. VSS.       Sesar Ponce RN  01/18/23 [id number]

## 2023-01-18 NOTE — ED PROVIDER NOTES
[address] [address] EMERGENCY DEPT    EMERGENCY MEDICINE      Pt Name: Yue Tariq  MRN: [id number]  [place] 1950  Date of evaluation: 1/18/2023  Treating Resident Physician: Dariana Eason DO  Supervising Physician: [address]       Chief Complaint   Patient presents with    Shortness of Breath     History obtained from chart review and the patient. HISTORY OF PRESENT ILLNESS    HPI    Yue Tariq is a 67 y.o. male presents to the emergency department for evaluation of shortness of breath. Patient states this is been ongoing for about 3 or 4 days now. He is not having any associated pain. Coming to the emergency department because concerned about the persistence of his symptoms. Reports some associated congestion but no cough, sputum production, changes in bowel movements, urination, fevers or chills. Associated lightheadedness with quick standing. No syncope, head strike or loss of consciousness. Denies any history of DVT or pulmonary embolus. Denies lower extremity swelling. The patient has no other acute complaints at this time. PAST MEDICAL AND SURGICAL HISTORY     Past Medical History:   Diagnosis Date    Hyperlipidemia     Hypertension        No past surgical history on file. CURRENT MEDICATIONS     Previous Medications    No medications on file       ALLERGIES     Allergies   Allergen Reactions    Penicillins Hives       FAMILY HISTORY   No family history on file. SOCIAL HISTORY     Social History     Tobacco Use    Smoking status: Never    Smokeless tobacco: Never   Vaping Use    Vaping Use: Never used   Substance Use Topics    Alcohol use:  Yes     Alcohol/week: 4.0 - 6.0 standard drinks     Types: 2 - 3 Cans of beer, 2 - 3 Shots of liquor per week     Comment: Weekends     Drug use: Yes     Types: Marijuana (Weed)     Comment: Occasional        PHYSICAL EXAM     ED Triage Vitals [01/18/23 1444]   BP Temp Temp Source Heart Rate Resp SpO2 Height Weight   (!) 151/77 98.7 °F (37.1 °C) Oral 85 18 97 % 5' 9\" (1.753 m) 160 lb (72.6 kg)     Body mass index is 23.63 kg/m². Initial vital signs and nursing assessment reviewed and patient with mild hypertension, no fever, tachypnea, hypoxia or tachycardia. Physical Exam  Constitutional:       General: He is not in acute distress. Appearance: Normal appearance. He is not ill-appearing, toxic-appearing or diaphoretic. Comments: No conversational dyspnea   HENT:      Head: Normocephalic and atraumatic. Mouth/Throat:      Mouth: Mucous membranes are moist.      Pharynx: Oropharynx is clear. No oropharyngeal exudate or posterior oropharyngeal erythema. Eyes:      Extraocular Movements: Extraocular movements intact. Conjunctiva/sclera: Conjunctivae normal.      Pupils: Pupils are equal, round, and reactive to light. Cardiovascular:      Rate and Rhythm: Normal rate and regular rhythm. Pulses: Normal pulses. Heart sounds: Normal heart sounds. No murmur heard. No friction rub. No gallop. Pulmonary:      Effort: Pulmonary effort is normal.      Breath sounds: Normal breath sounds. No wheezing, rhonchi or rales. Abdominal:      Palpations: Abdomen is soft. Tenderness: There is no abdominal tenderness. There is no guarding or rebound. Musculoskeletal:         General: No swelling. Normal range of motion. Cervical back: No rigidity. No muscular tenderness. Right lower leg: No tenderness. No edema. Left lower leg: No tenderness. No edema. Skin:     General: Skin is warm and dry. Capillary Refill: Capillary refill takes less than 2 seconds. Findings: No bruising, erythema or lesion. Neurological:      General: No focal deficit present. Mental Status: He is alert and oriented to person, place, and time. Cranial Nerves: No cranial nerve deficit. Sensory: No sensory deficit. Motor: No weakness.           FORMAL DIAGNOSTIC RESULTS     RADIOLOGY: Interpretation per the Radiologist below, if available at the time of this note (none if blank):    XR CHEST (2 VW)   Final Result   No acute abnormality identified. **This report has been created using voice recognition software. It may contain minor errors which are inherent in voice recognition technology. **      Final report electronically signed by Dr. Sandra Gilmore MD on 1/18/2023 3:14 PM          LABS: (none if blank)  Labs Reviewed   CBC WITH AUTO DIFFERENTIAL - Abnormal; Notable for the following components:       Result Value    WBC 13.7 (*)     RBC 1.83 (*)     Hemoglobin 5.1 (*)     Hematocrit 15.7 (*)     RDW-CV 18.0 (*)     RDW-SD 55.8 (*)     Platelets 544 (*)     MPV 8.9 (*)     All other components within normal limits   BASIC METABOLIC PANEL - Abnormal; Notable for the following components:    Sodium 128 (*)     Chloride 96 (*)     CO2 16 (*)     Glucose 120 (*)     Calcium 8.3 (*)     All other components within normal limits   OSMOLALITY - Abnormal; Notable for the following components:    Osmolality Calc 257.0 (*)     All other components within normal limits   RETICULOCYTES - Abnormal; Notable for the following components:    Retic Ct Abs 2.5 (*)     Immature Retic Fract 18.5 (*)     Retic Hemoglobin 19.6 (*)     All other components within normal limits   HEMOGLOBIN AND HEMATOCRIT - Abnormal; Notable for the following components:    Hemoglobin 4.6 (*)     Hematocrit 14.5 (*)     All other components within normal limits   COVID-19 & INFLUENZA COMBO   TROPONIN   BRAIN NATRIURETIC PEPTIDE   BLOOD OCCULT STOOL SCREEN #1   ANION GAP   GLOMERULAR FILTRATION RATE, ESTIMATED   LACTATE DEHYDROGENASE   SCAN OF BLOOD SMEAR   FERRITIN   IRON BINDING CAPACITY   IRON   IRON SATURATION   VITAMIN B12 & FOLATE   URINALYSIS WITH MICROSCOPIC   TYPE AND SCREEN   PREPARE RBC (CROSSMATCH)    Narrative:     [id number]     issued       (Any cultures that may have been sent were not resulted at the time of this patient visit)    MEDICAL DECISION MAKING     1) Number and Complexity of Problems            Problem List This Visit:         Chief Complaint   Patient presents with    Shortness of Breath            Differential Diagnosis includes (but not limited to):  Viral URI, bronchitis, COVID, influenza, dehydration        Diagnoses Considered with low index of suspicion:   ACS, pneumonia, pneumothorax, myocarditis, pericarditis, pulmonary embolus, aortic dissection, arrhythmia             Pertinent Comorbid Conditions:    Hypertension, hyperlipidemia    2)  Data Reviewed (none if blank or additional interpretation can be found in ED Course)          My Independent interpretations:     EKG:          Imaging:     Labs:      Normocytic anemia                 Decision Rules/Clinical Scores utilized:  Heart score not applicable, no chest pain            External Documentation Reviewed:   Previous patient encounter documents & history available on EMR was reviewed as available. Patient seen for dental abscess in 2019 . 3)  Treatment and Dispositio         ED Reassessment: Patient continues to rest comfortably in no acute distress. No complaint of pain, shortness of breath while resting. Case discussed with consulting clinician:           Shared Decision-Making was performed and disposition discussed with the        patient/caregiver at bedside with all questions answered. Reviewed and the importance of blood and oxygen delivery. Went through consent process with patient, paperwork filled out at the bedside. He is agreeable and understands the risks and benefits of receiving blood product. Additionally, Mehul Alejandre is agreeable to admission to the hospital for further monitoring and work-up of his anemia.          Social determinants of health impacting treatment or disposition:  None         Code Status: Full      Summary of Patient Presentation:      MDM  Number of Diagnoses or Management Options  Normocytic anemia: new, needed workup  Diagnosis management comments: Very pleasant 69-year-old gentleman resting on the cot no acute distress. Vitals are stable without hypotension, fever, tachycardia, tachypnea or hypoxia. No conversational dyspnea. Clear lungs bilaterally. No lower extremity swelling. Laboratory evaluation revealed normocytic anemia that is new. No GI bleed based on Hemoccult. Consented patient for blood product, 1 PRBC ordered to initiate stepwise resuscitation as patient is comfortable resting without any hypoxia or chest pain at this time. No evidence of critical endorgan ischemia.   Admission to hospitalist       Amount and/or Complexity of Data Reviewed  Clinical lab tests: ordered and reviewed  Tests in the radiology section of CPT®: reviewed and ordered  Tests in the medicine section of CPT®: ordered and reviewed  Decide to obtain previous medical records or to obtain history from someone other than the patient: yes  Obtain history from someone other than the patient: yes  Review and summarize past medical records: yes  Discuss the patient with other providers: yes  Independent visualization of images, tracings, or specimens: yes    Risk of Complications, Morbidity, and/or Mortality  Presenting problems: moderate  Diagnostic procedures: moderate  Management options: moderate    Patient Progress  Patient progress: stable  /  ED Course as of 01/18/23 1721   Wed Jan 18, 2023   1516 Hemoglobin Quant(!!): 5.1 [EM]   1538 Hematocrit(!!): 15.7 [GG]   1601 EKG SOB  Normal sinus rhythm [EM]   1601 XR CHEST (2 VW)  No acute findings [EM]   1601 MCV: 85.8  Normocytic  [EM]      ED Course User Index  [EM] Rhoda Al DO  [GG] Betty Moon DO     Vitals Reviewed:    Vitals:    01/18/23 1444 01/18/23 1540 01/18/23 1652   BP: (!) 151/77 (!) 151/77 (!) 146/68   Pulse: 85 88 95   Resp: 18 17 18   Temp: 98.7 °F (37.1 °C)  98.1 °F (36.7 °C)   TempSrc: Oral  Oral   SpO2: 97% 100% 97% Weight: 160 lb (72.6 kg)     Height: 5' 9\" (1.753 m)         The patient was seen and examined. Appropriate diagnostic testing was performed and results reviewed with the patient and/or caregivers. The results of pertinent diagnostic studies and exam findings were discussed. The patients provisional diagnosis and plan of care were discussed with the patient and present caregivers who expressed understanding. Any medications were reviewed and indications and risks of medications were discussed. Strict verbal and written return precautions, instructions and appropriate follow-up were provided to the patient. ED Medications administered this visit:  (None if blank)  Medications   0.9 % sodium chloride infusion (has no administration in time range)       PROCEDURES: (None if blank)  Procedures:     CRITICAL CARE: (None if blank)    DISCHARGE PRESCRIPTIONS: (None if blank)  New Prescriptions    No medications on file         FINAL IMPRESSION     Final diagnoses:   Normocytic anemia     1. Normocytic anemia        DISPOSITION / PLAN   DISPOSITION Admitted 01/18/2023 05:03:12 PM      OUTPATIENT FOLLOW UP THE PATIENT:  No follow-up provider specified. This transcription was electronically signed. Parts of this transcriptions may have been dictated by use of voice recognition software and electronically transcribed, and parts may have been transcribed with the assistance of an ED scribe. The transcription may contain errors not detected in proofreading. Please refer to my supervising physician's documentation if my documentation differs.     Electronically Signed: Rolando Sweeney DO, 01/18/23, 5:21 PM         Rolando Sweeney DO  Resident  01/18/23 6808

## 2023-01-18 NOTE — ED NOTES
Pt transported to [facility] on cart in stable condition. Floor contacted prior to transport.      Mariella Newberry  01/18/23 1844

## 2023-01-18 NOTE — ED NOTES
Pt receiving blood at this time. RN has been monitoring patients vitals signs for 10 minutes, no signs of transfusion reaction at this time.       Liza Hernandez RN  01/18/23 5976

## 2023-01-18 NOTE — ED TRIAGE NOTES
Pt presents to the ED with c/o SOB. Pt reports this has been going on for about 4 days. Pt denies any pain right now. Pt states he has never felt like this before. EKG complete.

## 2023-01-19 PROBLEM — E61.1 IRON DEFICIENCY: Status: ACTIVE | Noted: 2023-01-19

## 2023-01-19 PROBLEM — E87.1 CHRONIC HYPONATREMIA: Status: ACTIVE | Noted: 2023-01-19

## 2023-01-19 PROBLEM — D72.829 LEUKOCYTOSIS: Status: ACTIVE | Noted: 2023-01-19

## 2023-01-19 PROBLEM — E78.5 HYPERLIPIDEMIA: Status: ACTIVE | Noted: 2023-01-19

## 2023-01-19 PROBLEM — I10 ESSENTIAL HYPERTENSION: Status: ACTIVE | Noted: 2023-01-19

## 2023-01-19 PROBLEM — D75.839 THROMBOCYTOSIS: Status: ACTIVE | Noted: 2023-01-19

## 2023-01-19 PROBLEM — R53.1 GENERALIZED WEAKNESS: Status: ACTIVE | Noted: 2023-01-19

## 2023-01-19 PROBLEM — E83.51 HYPOCALCEMIA: Status: ACTIVE | Noted: 2023-01-19

## 2023-01-19 PROBLEM — K21.9 GASTROESOPHAGEAL REFLUX DISEASE: Status: ACTIVE | Noted: 2023-01-19

## 2023-01-19 PROBLEM — R06.00 DYSPNEA: Status: ACTIVE | Noted: 2023-01-19

## 2023-01-19 LAB
ACANTHOCYTES: ABNORMAL
ANION GAP SERPL CALCULATED.3IONS-SCNC: 11 MEQ/L (ref 8–16)
BASOPHILS # BLD: 0.1 %
BASOPHILS # BLD: 0.3 %
BASOPHILS ABSOLUTE: 0 THOU/MM3 (ref 0–0.1)
BASOPHILS ABSOLUTE: 0 THOU/MM3 (ref 0–0.1)
BUN BLDV-MCNC: 8 MG/DL (ref 7–22)
CALCIUM IONIZED: 1.07 MMOL/L (ref 1.12–1.32)
CALCIUM SERPL-MCNC: 8.2 MG/DL (ref 8.5–10.5)
CHLORIDE BLD-SCNC: 99 MEQ/L (ref 98–111)
CO2: 19 MEQ/L (ref 23–33)
CREAT SERPL-MCNC: 1 MG/DL (ref 0.4–1.2)
EOSINOPHIL # BLD: 0.2 %
EOSINOPHIL # BLD: 0.8 %
EOSINOPHILS ABSOLUTE: 0 THOU/MM3 (ref 0–0.4)
EOSINOPHILS ABSOLUTE: 0.1 THOU/MM3 (ref 0–0.4)
ERYTHROCYTE [DISTWIDTH] IN BLOOD BY AUTOMATED COUNT: 16.6 % (ref 11.5–14.5)
ERYTHROCYTE [DISTWIDTH] IN BLOOD BY AUTOMATED COUNT: 18 % (ref 11.5–14.5)
ERYTHROCYTE [DISTWIDTH] IN BLOOD BY AUTOMATED COUNT: 51.8 FL (ref 35–45)
ERYTHROCYTE [DISTWIDTH] IN BLOOD BY AUTOMATED COUNT: 55.8 FL (ref 35–45)
GFR SERPL CREATININE-BSD FRML MDRD: > 60 ML/MIN/1.73M2
GLUCOSE BLD-MCNC: 90 MG/DL (ref 70–108)
HCT VFR BLD CALC: 15.7 % (ref 42–52)
HCT VFR BLD CALC: 19.7 % (ref 42–52)
HCT VFR BLD CALC: 20.8 % (ref 42–52)
HCT VFR BLD CALC: 23.4 % (ref 42–52)
HCT VFR BLD CALC: 23.7 % (ref 42–52)
HEMOGLOBIN: 5.1 GM/DL (ref 14–18)
HEMOGLOBIN: 6.6 GM/DL (ref 14–18)
HEMOGLOBIN: 6.9 GM/DL (ref 14–18)
HEMOGLOBIN: 7.8 GM/DL (ref 14–18)
HEMOGLOBIN: 7.9 GM/DL (ref 14–18)
IMMATURE GRANS (ABS): 0.07 THOU/MM3 (ref 0–0.07)
IMMATURE GRANS (ABS): 0.11 THOU/MM3 (ref 0–0.07)
IMMATURE GRANULOCYTES: 0.7 %
IMMATURE GRANULOCYTES: 0.8 %
LACTIC ACID: 0.9 MMOL/L (ref 0.5–2)
LYMPHOCYTES # BLD: 14.8 %
LYMPHOCYTES # BLD: 17.6 %
LYMPHOCYTES ABSOLUTE: 1.8 THOU/MM3 (ref 1–4.8)
LYMPHOCYTES ABSOLUTE: 2 THOU/MM3 (ref 1–4.8)
MCH RBC QN AUTO: 27.9 PG (ref 26–33)
MCH RBC QN AUTO: 28.3 PG (ref 26–33)
MCHC RBC AUTO-ENTMCNC: 32.5 GM/DL (ref 32.2–35.5)
MCHC RBC AUTO-ENTMCNC: 33.2 GM/DL (ref 32.2–35.5)
MCV RBC AUTO: 85.2 FL (ref 80–94)
MCV RBC AUTO: 85.8 FL (ref 80–94)
MONOCYTES # BLD: 10.2 %
MONOCYTES # BLD: 9.3 %
MONOCYTES ABSOLUTE: 1.1 THOU/MM3 (ref 0.4–1.3)
MONOCYTES ABSOLUTE: 1.3 THOU/MM3 (ref 0.4–1.3)
MRSA SCREEN RT-PCR: NEGATIVE
NUCLEATED RED BLOOD CELLS: 0 /100 WBC
NUCLEATED RED BLOOD CELLS: 0 /100 WBC
OSMOLALITY CALCULATION: 256.8 MOSMOL/KG (ref 275–300)
OSMOLALITY URINE: 297 MOSMOL/KG (ref 250–750)
PATHOLOGIST REVIEW: ABNORMAL
PLATELET # BLD: 490 THOU/MM3 (ref 130–400)
PLATELET # BLD: 592 THOU/MM3 (ref 130–400)
PLATELET ESTIMATE: ABNORMAL
PMV BLD AUTO: 8.9 FL (ref 9.4–12.4)
PMV BLD AUTO: 8.9 FL (ref 9.4–12.4)
POIKILOCYTES: ABNORMAL
POTASSIUM SERPL-SCNC: 4.1 MEQ/L (ref 3.5–5.2)
RBC # BLD: 1.83 MILL/MM3 (ref 4.7–6.1)
RBC # BLD: 2.44 MILL/MM3 (ref 4.7–6.1)
SEG NEUTROPHILS: 70.4 %
SEG NEUTROPHILS: 74.8 %
SEGMENTED NEUTROPHILS ABSOLUTE COUNT: 10.2 THOU/MM3 (ref 1.8–7.7)
SEGMENTED NEUTROPHILS ABSOLUTE COUNT: 7.3 THOU/MM3 (ref 1.8–7.7)
SODIUM BLD-SCNC: 129 MEQ/L (ref 135–145)
TARGET CELLS: ABNORMAL
VANCOMYCIN RESISTANT ENTEROCOCCUS: NEGATIVE
WBC # BLD: 10.3 THOU/MM3 (ref 4.8–10.8)
WBC # BLD: 13.7 THOU/MM3 (ref 4.8–10.8)

## 2023-01-19 PROCEDURE — 85025 COMPLETE CBC W/AUTO DIFF WBC: CPT

## 2023-01-19 PROCEDURE — 99232 SBSQ HOSP IP/OBS MODERATE 35: CPT | Performed by: FAMILY MEDICINE

## 2023-01-19 PROCEDURE — 96366 THER/PROPH/DIAG IV INF ADDON: CPT

## 2023-01-19 PROCEDURE — 6360000002 HC RX W HCPCS

## 2023-01-19 PROCEDURE — 97166 OT EVAL MOD COMPLEX 45 MIN: CPT

## 2023-01-19 PROCEDURE — 85014 HEMATOCRIT: CPT

## 2023-01-19 PROCEDURE — G0378 HOSPITAL OBSERVATION PER HR: HCPCS

## 2023-01-19 PROCEDURE — 85018 HEMOGLOBIN: CPT

## 2023-01-19 PROCEDURE — 2580000003 HC RX 258: Performed by: PHYSICIAN ASSISTANT

## 2023-01-19 PROCEDURE — 6370000000 HC RX 637 (ALT 250 FOR IP): Performed by: PHYSICIAN ASSISTANT

## 2023-01-19 PROCEDURE — 36430 TRANSFUSION BLD/BLD COMPNT: CPT

## 2023-01-19 PROCEDURE — 83605 ASSAY OF LACTIC ACID: CPT

## 2023-01-19 PROCEDURE — 96367 TX/PROPH/DG ADDL SEQ IV INF: CPT

## 2023-01-19 PROCEDURE — 82330 ASSAY OF CALCIUM: CPT

## 2023-01-19 PROCEDURE — 2060000000 HC ICU INTERMEDIATE R&B

## 2023-01-19 PROCEDURE — 36415 COLL VENOUS BLD VENIPUNCTURE: CPT

## 2023-01-19 PROCEDURE — 6360000002 HC RX W HCPCS: Performed by: PHYSICIAN ASSISTANT

## 2023-01-19 PROCEDURE — 80048 BASIC METABOLIC PNL TOTAL CA: CPT

## 2023-01-19 RX ORDER — SIMVASTATIN 40 MG
40 TABLET ORAL NIGHTLY
COMMUNITY

## 2023-01-19 RX ORDER — OMEPRAZOLE 20 MG/1
20 CAPSULE, DELAYED RELEASE ORAL DAILY
COMMUNITY

## 2023-01-19 RX ORDER — CALCIUM GLUCONATE 20 MG/ML
2000 INJECTION, SOLUTION INTRAVENOUS ONCE
Status: COMPLETED | OUTPATIENT
Start: 2023-01-19 | End: 2023-01-19

## 2023-01-19 RX ORDER — NAPROXEN 500 MG/1
500 TABLET ORAL 2 TIMES DAILY WITH MEALS
COMMUNITY

## 2023-01-19 RX ORDER — SODIUM CHLORIDE 9 MG/ML
INJECTION, SOLUTION INTRAVENOUS PRN
Status: DISCONTINUED | OUTPATIENT
Start: 2023-01-19 | End: 2023-01-20 | Stop reason: HOSPADM

## 2023-01-19 RX ADMIN — PANTOPRAZOLE SODIUM 40 MG: 40 TABLET, DELAYED RELEASE ORAL at 05:44

## 2023-01-19 RX ADMIN — SODIUM CHLORIDE, PRESERVATIVE FREE 10 ML: 5 INJECTION INTRAVENOUS at 08:41

## 2023-01-19 RX ADMIN — LISINOPRIL 20 MG: 20 TABLET ORAL at 08:42

## 2023-01-19 RX ADMIN — ATORVASTATIN CALCIUM 20 MG: 20 TABLET, FILM COATED ORAL at 21:05

## 2023-01-19 RX ADMIN — BRIMONIDINE TARTRATE 1 DROP: 2 SOLUTION OPHTHALMIC at 08:41

## 2023-01-19 RX ADMIN — SODIUM CHLORIDE, PRESERVATIVE FREE 10 ML: 5 INJECTION INTRAVENOUS at 21:06

## 2023-01-19 RX ADMIN — LATANOPROST 1 DROP: 50 SOLUTION OPHTHALMIC at 19:22

## 2023-01-19 RX ADMIN — CALCIUM GLUCONATE 2000 MG: 20 INJECTION, SOLUTION INTRAVENOUS at 14:04

## 2023-01-19 RX ADMIN — IRON SUCROSE 200 MG: 20 INJECTION, SOLUTION INTRAVENOUS at 18:11

## 2023-01-19 ASSESSMENT — PAIN SCALES - GENERAL
PAINLEVEL_OUTOF10: 0

## 2023-01-19 NOTE — PLAN OF CARE
Problem: Discharge Planning  Goal: Discharge to home or other facility with appropriate resources  Outcome: Progressing  Flowsheets (Taken 1/19/2023 0322)  Discharge to home or other facility with appropriate resources:   Identify barriers to discharge with patient and caregiver   Identify discharge learning needs (meds, wound care, etc)     Problem: Pain  Goal: Verbalizes/displays adequate comfort level or baseline comfort level  Outcome: Progressing  Flowsheets (Taken 1/19/2023 0322)  Verbalizes/displays adequate comfort level or baseline comfort level:   Encourage patient to monitor pain and request assistance   Administer analgesics based on type and severity of pain and evaluate response   Assess pain using appropriate pain scale   Implement non-pharmacological measures as appropriate and evaluate response     Problem: Safety - Adult  Goal: Free from fall injury  Outcome: Progressing  Flowsheets (Taken 1/19/2023 0322)  Free From Fall Injury: Instruct family/caregiver on patient safety     Problem: Hematologic - Adult  Goal: Maintains hematologic stability  Outcome: Progressing  Flowsheets (Taken 1/19/2023 0322)  Maintains hematologic stability:   Assess for signs and symptoms of bleeding or hemorrhage   Administer blood products/factors as ordered     Problem: Respiratory - Adult  Goal: Achieves optimal ventilation and oxygenation  Outcome: Progressing  Flowsheets (Taken 1/19/2023 0322)  Achieves optimal ventilation and oxygenation: Assess for changes in respiratory status     Problem: Skin/Tissue Integrity  Goal: Absence of new skin breakdown  Description: 1. Monitor for areas of redness and/or skin breakdown  2. Assess vascular access sites hourly  3. Every 4-6 hours minimum:  Change oxygen saturation probe site  4. Every 4-6 hours:  If on nasal continuous positive airway pressure, respiratory therapy assess nares and determine need for appliance change or resting period.   Outcome: Progressing  Note: Patient turns self appropriately. No evidence of skin breakdown.       Problem: Chronic Conditions and Co-morbidities  Goal: Patient's chronic conditions and co-morbidity symptoms are monitored and maintained or improved  Outcome: Progressing  Flowsheets (Taken 1/19/2023 0322)  Care Plan - Patient's Chronic Conditions and Co-Morbidity Symptoms are Monitored and Maintained or Improved:   Monitor and assess patient's chronic conditions and comorbid symptoms for stability, deterioration, or improvement   Collaborate with multidisciplinary team to address chronic and comorbid conditions and prevent exacerbation or deterioration

## 2023-01-19 NOTE — CARE COORDINATION
01/19/23 1018   Service Assessment   Patient Orientation Alert and Oriented   Cognition Alert   History Provided By Patient   Primary Caregiver Self   Accompanied By/Relationship none   [address] is: Legal Next of Kin  (spiritual care referral to make [address])   PCP Verified by CM Yes   Last Visit to PCP Within last 6 months   Prior Functional Level Independent in ADLs/IADLs   Current Functional Level Independent in ADLs/IADLs   Can patient return to prior living arrangement Yes   Ability to make needs known: Good   Family able to assist with home care needs: Yes   Would you like for me to discuss the discharge plan with any other family members/significant others, and if so, who? No   Financial Resources Medicare   CM/SW Referral ADLs/IADLs   Discharge Planning   Type of Residence Trailer/Mobile Home   Living Arrangements Alone   Current Services Prior To Admission None   Potential Assistance Needed N/A   DME Ordered? No   Potential Assistance Purchasing Medications No   Type of Home Care Services None   Patient expects to be discharged to: Trailer/mobile home   Follow Up Appointment: Best Day/Time    (Late AM)   One/Two Story Residence One story   Services At/After Discharge   Transition of Care Consult (CM Consult) [address] Discharge None   [employer] Information Provided? Yes   Confirm Follow Up Transport Family   Condition of Participation: Discharge Planning   The Plan for Transition of Care is related to the following treatment goals: Anemia treatment   Freedom of Choice list was provided with basic dialogue that supports the patient's individualized plan of care/goals, treatment preferences, and shares the quality data associated with the providers?   No

## 2023-01-19 NOTE — PROGRESS NOTES
VN completed admission questions at this time. Pt does not know the names of the medication he takes at home. Spoke to son Simi Garcia via telephone and he said he could bring in the bottles to verify home medication list tomorrow. Pt resting in bed with call light in reach. No voiced questions or concerns.

## 2023-01-19 NOTE — ACP (ADVANCE CARE PLANNING)
Advance Care Planning     Advance Care Planning Inpatient Note  [facility] Department    Today's Date: 1/19/2023  Unit: [facility] ICU STEPDOWN TELEMETRY 4K    Received request from IDT Member. Upon review of chart and communication with care team, patient's decision making abilities are not in question. . Patient was/were present in the room during visit. Goals of ACP Conversation:  Discuss advance care planning documents    Health Care Decision Makers:       Primary Decision Maker: danny remy - [facility] - [phone]  Summary:  Completed New Documents    Advance Care Planning Documents (Patient Wishes):  Healthcare Power of /Advance Directive Appointment of Health Care Agent  Living Will/Advance Directive     Assessment:  Advanced Directives Consult: It was completed and filed. Interventions:  Assisted in the completion of documents according to patient's wishes at this time        Outcomes/Plan:  New advance directive completed.     Electronically signed by Lisa Berger, [address] on 1/19/2023 at 11:20 AM

## 2023-01-19 NOTE — PROGRESS NOTES
PROGRESS NOTE      Patient:  Deborah Kirby      Unit/Bed:4K-21/021-A    YOB: 1950    MRN: [id number]       Acct: [de-identified]     PCP: RAY William, MSN, APRN - CNP    Date of Admission: 1/18/2023      Assessment/Plan:    Symptomatic iron deficiency anemia, improved:  - Patient presented with increased weakness, fatigue, and shortness of breath  - Hemoglobin on arrival  5.1 (unclear baseline as patient follows through the [place]), Blood occult stool negative, no other acute signs or symptoms of bleeding  - Further work-up demonstrated iron level of 8, iron saturation 2%, TIBC within normal limits, ferritin 6, and a corrected reticulocyte count of 0.44%  - Patient has received 3 total units of PRBCs, 3 doses of IV iron ordered with 1 already received  - Hemoglobin has since improved to 7.8 (1/19)  - Continue to monitor daily labs, hemoglobin stable currently  - Transfuse for hemoglobin less than 7 or symptomatic.  - SCDs for DVT prophylaxis  - Patient will need close outpatient follow-up for likely colonoscopy (patient states he had colonoscopy 3 years ago is unsure when he was told to follow-up)    Chronic hyponatremia, stable:  - Per chart review patient's sodium level around 129 in 2019, was told to follow with PCP regarding labs, unclear if this occurred  - Patient asymptomatic with no altered mental status, etc.  - No diuretic use at home  - Sodium 128 on arrival, stable at 129 (1/19)  - We will monitor daily labs    Hypocalcemia:  - Likely secondary to possible decreased p.o. intake from from symptomatic iron deficiency  - Ionized calcium 1.07 (1/19)  - Replacement protocol in place, will monitor daily labs    Leukocytosis, improved:  - Likely reactive in nature secondary to severe iron deficiency anemia  - WBC 13.7 on arrival, 10.3 (1/19)  - Afebrile, continue to monitor daily lab    Thrombocytosis, improved:  - Patient noted to have thrombocytosis in the past and chart review in 2019, may be chronic in nature or reactive due to severe anemia  - Platelet count 127 on arrival, 490 (1/19)  - We will continue to monitor daily labs    Dyspnea, resolved:  - Patient complaining of feeling short of breath on arrival, nonhypoxic on room air  - Likely secondary to severe iron deficiency anemia as noted above  - Influenza A/B and COVID testing negative  - Chest x-ray on arrival shows no acute abnormality    Essential hypertension:  - Controlled and stable  - Continue home lisinopril 20 daily    Hyperlipidemia:  - Continue Lipitor daily    Glaucoma:  - Continue brimonidine in the morning and latanoprost in the evening    GERD:  - Controlled, continue Protonix    Generalized Weakness:  - PT/OT     Chief Complaint: Shortness of breath    Hospital Course:   As noted per HPI:     Richard De La Rosa is a 67 y.o. male with a history of colon polyps, essential hypertension, hyperlipidemia, glaucoma, and gastroesophageal reflux disease who presented to [facility] with chief complaint of shortness of breath. The patient states for approximately the last year he has become more fatigued and short of breath, primarily with exertion. Over the last few days, this has increased significantly. The patient consulted with his PCP who recommended he come to the emergency department for further evaluation. On arrival to the emergency department, his hemoglobin was noted to be 5.1. The patient has no history of anemia. He has no family history of any blood disorders or malignancy. He denies any overt signs of bleeding including hematuria, hematochezia, or melena. He eats a normal diet and has no history of iron deficiency. His last colonoscopy was 3 years ago and was noted to be normal.  He did have colon polyps on a prior colonoscopy approximately 3 years prior. He denies any chest pain, cough, abdominal pain, nausea, vomiting, or diarrhea. He does endorse some intermittent constipation.   No weight loss, fevers, chills, or night sweats. He does not use tobacco, but does report significant alcohol use, but states he has not drank since before the new year. Previously, he was drinking a small pint of liquor at a time. He does use marijuana. No occupational exposures to any heavy metals or chemicals. 1/19/2023 patient stable, has received a total of 3 units of PRBCs, 1 dose of IV iron. We will monitor H&H's and if remains stable will plan for discharge tomorrow [facility]. Subjective:   Patient seen in the a.m. no acute events noted overnight. Patient states over the last 3 to 4 days he has continued to feel increasing weakness fatigue and lightheadedness. He states he believes he has been having good p.o. intake. [facility] he follows with a PCP from the [place], follows with them once a year. [facility] his next appointment is in August.  [facility] he had a colonoscopy 3 years ago but cannot recall what they found or when he is due for a repeat. States he has not noticed any dark tarry stools or blood in his stools. [facility] he has never been told that he is having iron deficiency anemia, or that his blood levels were low, or that his sodium was low. States he is cut back on his drinking since the beginning of the year, but did drink beer, liquor, ROM previously. States he is feeling better this morning after giving blood and IV iron. Denies feeling short of breath or having any chest pains, denies abdominal pains.       Medications:  Reviewed    Infusion Medications    sodium chloride      sodium chloride      sodium chloride      sodium chloride       Scheduled Medications    calcium replacement protocol   Other RX Placeholder    calcium gluconate  2,000 mg IntraVENous Once    sodium chloride flush  5-40 mL IntraVENous 2 times per day    iron sucrose  200 mg IntraVENous Q24H    pantoprazole  40 mg Oral QAM AC    lisinopril  20 mg Oral Daily    latanoprost  1 drop Both Eyes Nightly brimonidine  1 drop Both Eyes BID    atorvastatin  20 mg Oral Nightly     PRN Meds: sodium chloride, sodium chloride, sodium chloride flush, sodium chloride, ondansetron **OR** ondansetron, polyethylene glycol, acetaminophen **OR** acetaminophen, sodium chloride      Intake/Output Summary (Last 24 hours) at 1/19/2023 1448  Last data filed at 1/19/2023 1348  Gross per 24 hour   Intake 2322.08 ml   Output 1545 ml   Net 777.08 ml       Diet:  ADULT DIET; Regular    Exam:  /67   Pulse 72   Temp 97.9 °F (36.6 °C) (Oral)   Resp 16   Ht 5' 9.02\" (1.753 m)   Wt 148 lb 8 oz (67.4 kg)   SpO2 100%   BMI 21.92 kg/m²     General appearance: No apparent distress, appears stated age and cooperative. HEENT: Pupils equal, round, and reactive to light. Conjunctivae/corneas clear. Neck: Supple, with full range of motion. No jugular venous distention. Trachea midline. Respiratory:  Normal respiratory effort. Clear to auscultation, bilaterally without Rales/Wheezes/Rhonchi. Cardiovascular: Regular rate and rhythm with normal S1/S2 without murmurs, rubs or gallops. Abdomen: Soft, non-tender, non-distended with normal bowel sounds. Musculoskeletal: passive and active ROM x 4 extremities. Skin: Skin color, texture, turgor normal.  No rashes or lesions. Neurologic:  Neurovascularly intact without any focal sensory/motor deficits. Cranial nerves: II-XII intact, grossly non-focal.  Psychiatric: Alert and oriented, thought content appropriate, normal insight  Capillary Refill: Brisk,< 3 seconds   Peripheral Pulses: +2 palpable, equal bilaterally       Labs:   Recent Labs     01/18/23  1445 01/18/23  1632 01/19/23  0437 01/19/23  0744 01/19/23  1215   WBC 13.7*  --  10.3  --   --    HGB 5.1*   < > 6.9* 7.9* 7.8*   HCT 15.7*   < > 20.8* 23.4* 23.7*   *  --  490*  --   --     < > = values in this interval not displayed.      Recent Labs     01/18/23  1445 01/19/23  0437   * 129*   K 4.0 4.1   CL 96* 99   CO2 16* 19*   BUN 9 8   CREATININE 1.0 1.0   CALCIUM 8.3* 8.2*     No results for input(s): AST, ALT, BILIDIR, BILITOT, ALKPHOS in the last 72 hours. No results for input(s): INR in the last 72 hours. No results for input(s): Erika Beat in the last 72 hours. Urinalysis:      Lab Results   Component Value Date/Time    NITRU NEGATIVE 01/18/2023 10:38 PM    WBCUA NONE SEEN 01/18/2023 10:38 PM    BACTERIA NONE SEEN 01/18/2023 10:38 PM    RBCUA NONE SEEN 01/18/2023 10:38 PM    BLOODU NEGATIVE 01/18/2023 10:38 PM    SPECGRAV 1.010 01/18/2023 10:38 PM       Radiology:  XR CHEST (2 VW)   Final Result   No acute abnormality identified. **This report has been created using voice recognition software. It may contain minor errors which are inherent in voice recognition technology. **      Final report electronically signed by Dr. Percy Oconnor MD on 1/18/2023 3:14 PM          Diet: ADULT DIET;  Regular    DVT prophylaxis: [] Lovenox                                 [x] SCDs                                 [] SQ Heparin                                 [] Encourage ambulation           [] Already on Anticoagulation     Disposition:    [x] Home       [] TCU       [] Rehab       [] Psych       [] SNF       [] [place]       [] Other-    Code Status: Full Code    PT/OT Eval Status: pended      Electronically signed by Arlene De Jesus DO on 1/19/2023 at 2:48 PM

## 2023-01-19 NOTE — CARE COORDINATION
Case Management Assessment  Initial Evaluation    Date/Time of Evaluation: 1/19/2023 11:39 AM  Assessment Completed by: Zeeshan Ortiz RN    If patient is discharged prior to next notation, then this note serves as note for discharge by case management. Patient Name: Sandy Gusman                   YOB: 1950  Diagnosis: Normocytic anemia [D64.9]  Symptomatic anemia [D64.9]                   Date / Time: 1/18/2023  2:38 PM  Location: [address] [place] [address]     Patient Admission Status: Inpatient   Readmission Risk (Low < 19, Mod (19-27), High > 27): Readmission Risk Score: 8.6    Current PCP: Day Ricks, RAY, MSN, APRN - CNP  PCP verified by CM? Yes    Chart Reviewed: Yes      History Provided by: Patient  Patient Orientation: Alert and Oriented    Patient Cognition: Alert    Hospitalization in the last 30 days (Readmission):  No    If yes, Readmission Assessment in CM Navigator will be completed. Advance Directives:      Code Status: Full Code   Patient's Primary Decision Maker is: Legal Next of Kin (spiritual care referral to make [address])    Primary Decision Maker: danny remy - Child - [phone]    Discharge Planning:    Patient lives with: Alone Type of Home: Trailer/Mobile Home  Primary Care Giver: Self  Patient Support Systems include: Children   Current Financial resources: Medicare  Current community resources:    Current services prior to admission: None            Current DME:              Type of Home Care services:  None    ADLS  Prior functional level: Independent in ADLs/IADLs  Current functional level: Independent in ADLs/IADLs    Family can provide assistance at DC: Yes  Would you like Case Management to discuss the discharge plan with any other family members/significant others, and if so, who?  No  Plans to Return to Present Housing: Yes  Other Identified Issues/Barriers to RETURNING to current housing: yes  Potential Assistance needed at discharge: N/A Potential DME:    Patient expects to discharge to: Trailer/mobile home  Plan for transportation at discharge: Family    Financial    Payor: Juice Crowell 136 / Plan: Jame 58 / Product Type: *No Product type* /     Does insurance require precert for SNF: Yes    Potential assistance Purchasing Medications: No  Meds-to-Beds request: Yes    No Pharmacies Listed    Notes:    Factors facilitating achievement of predicted outcomes: Cooperative, Pleasant, Sense of humor, Good insight into deficits, and Has needed Durable Medical Equipment at home    Barriers to discharge: none    Additional Case Management Notes:   Anemia    H 5.1; blood transfusion orders      The Plan for Transition of Care is related to the following treatment goals of Normocytic anemia [D64.9]  Symptomatic anemia [D64.9]    Patient Goals/Plan/Treatment Preferences: denied needs as plans home alone independently as PTA when medically cleared; therapy following; has cane, walker, current w [id number] [facility]  Transportation/Food Security/Housekeeping Addressed: No issues identified.      Blessing Srivastava RN  Case Management Department

## 2023-01-19 NOTE — PROGRESS NOTES
[facility] ICU STEPDOWN TELEMETRY 4K  EVALUATION    Time:    Time In: 7057  Time Out: 1212  Timed Code Treatment Minutes: 0 Minutes  Minutes: 9          Date: 2023  Patient Name: Moshe Moore,   Gender: male      MRN: [id number]  : 1950  (67 y.o.)  Referring Practitioner: Susan Gamez PA-C  Diagnosis: symptomatic anemia  Additional Pertinent Hx: Moshe Moore is a 67 y.o. male with a history of colon polyps, essential hypertension, hyperlipidemia, glaucoma, and gastroesophageal reflux disease who presented to [address] [place] [address] with chief complaint of shortness of breath. The patient states for approximately the last year he has become more fatigued and short of breath, primarily with exertion. Over the last few days, this has increased significantly. The patient consulted with his PCP who recommended he come to the emergency department for further evaluation. On arrival to the emergency department, his hemoglobin was noted to be 5.1. The patient has no history of anemia. He has no family history of any blood disorders or malignancy. He denies any overt signs of bleeding including hematuria, hematochezia, or melena. He eats a normal diet and has no history of iron deficiency. His last colonoscopy was 3 years ago and was noted to be normal.  He did have colon polyps on a prior colonoscopy approximately 3 years prior. He denies any chest pain, cough, abdominal pain, nausea, vomiting, or diarrhea. He does endorse some intermittent constipation. No weight loss, fevers, chills, or night sweats. He does not use tobacco, but does report significant alcohol use, but states he has not drank since before the new year. Previously, he was drinking a small pint of liquor at a time. He does use marijuana. No occupational exposures to any heavy metals or chemicals.     Restrictions/Precautions:  Restrictions/Precautions: General Precautions  Position Activity Restriction  Other position/activity restrictions: monitor hemoglobin    Subjective  Chart Reviewed: Yes, Orders, Progress Notes, History and Physical, Labs  Patient assessed for rehabilitation services?: Yes  Response to previous treatment: Patient with no complaints from previous session  Family / Caregiver Present: No    Subjective: RN ok'ed OT session. Pt supine in bed and agreeable to therapy. Pain: 0/10    Vitals: Vitals not assessed per clinical judgement, see nursing flowsheet    Social/Functional History:  Lives With: Alone  Type of Home: Mobile home  Home Layout: One level  Home Access: Stairs to enter without rails, Ramped entrance  Entrance Stairs - Number of Steps: 1  Home Equipment: Cane   Bathroom Shower/Tub: Tub/Shower unit  Bathroom Toilet: Standard  Bathroom Equipment: Shower chair, Grab bars in shower       ADL Assistance: [address]: Independent  Transfer Assistance: Independent    Active : Yes          VISION:WFL    HEARING:  WFL    COGNITION: WFL    RANGE OF MOTION:  Bilateral Upper Extremity:  WFL    STRENGTH:  Bilateral Upper Extremity:  WFL    SENSATION:   WFL    ADL:   Footwear Management: Stand By Assistance. Adjust socks  . BALANCE:  Sitting Balance:  Supervision. Standing Balance: Contact Guard Assistance. BED MOBILITY:  Sit to Supine: Stand By Assistance      TRANSFERS:  Sit to Stand:  Stand By Assistance. Stand to Sit: Contact Guard Assistance. FUNCTIONAL MOBILITY:  Assistive Device: None  Assist Level:  Contact Guard Assistance. Distance:  around bed to chair   Slightly unsteady but no LOB          Activity Tolerance:  Patient tolerance of  treatment: good. Assessment:  Assessment: Pt admitted to the hospital d/t symptomatic anemia. Pt requires increased  assistance for ADL tasks, functional mobility and transfers compared to PLOF.  Pt displayed  decreased endurance, balance, & safety awareness which effects ability to perform ADLs/IADLs in comparison to PLOF. Pt with significant increase in burden of care. Without skilled OT intervention pt at risk for functional decline, increased falls, and readmission to hospital.      Performance deficits / Impairments: Decreased functional mobility , Decreased ADL status, Decreased endurance, Decreased high-level IADLs, Decreased strength, Decreased balance  Prognosis: Good  REQUIRES OT FOLLOW-UP: Yes  Decision Making: Medium Complexity    Treatment Initiated: No treatment initiated    Discharge Recommendations:  Home with assist PRN    Patient Education:     Patient Education  Education Given To: Patient  Education Provided: Role of Therapy, ADL Adaptive Strategies, Transfer Training  Education Method: Demonstration  Barriers to Learning: None  Education Outcome: Verbalized understanding, Demonstrated understanding    Equipment Recommendations:  Equipment Needed: No    Plan:  Times Per Week: 3-5x  Times Per Day: Once a day  Current Treatment Recommendations: Strengthening, Balance training, ROM, Safety education & training, Functional mobility training, Endurance training, Patient/Caregiver education & training, Equipment evaluation, education, & procurement, Self-Care / ADL. See long-term goal time frame for expected duration of plan of care. If no long-term goals established, a short length of stay is anticipated.     Goals:  Patient goals : return home  Short Term Goals  Time Frame for Short Term Goals: by discharge  Short Term Goal 1: pt will complete functional mobility to/from bathroom and [facility] distances with supervision to access ADLs  Short Term Goal 2: pt will complete BADL routine with supervision to complete self care tasks  Short Term Goal 3: pt will complete dynamic standing x8 minutes with supervision to complete meal prep  Long Term Goals  Time Frame for Long Term Goals : no LTG est d/t short ELOS         Following session, patient left in safe position with all fall risk precautions in place.

## 2023-01-20 VITALS
HEART RATE: 86 BPM | RESPIRATION RATE: 18 BRPM | HEIGHT: 69 IN | TEMPERATURE: 98.6 F | DIASTOLIC BLOOD PRESSURE: 72 MMHG | OXYGEN SATURATION: 100 % | WEIGHT: 148.5 LBS | BODY MASS INDEX: 22 KG/M2 | SYSTOLIC BLOOD PRESSURE: 129 MMHG

## 2023-01-20 LAB
ANION GAP SERPL CALC-SCNC: 11 MEQ/L (ref 8–16)
BASOPHILS ABSOLUTE: 0.1 THOU/MM3 (ref 0–0.1)
BASOPHILS NFR BLD AUTO: 0.5 %
BUN SERPL-MCNC: 8 MG/DL (ref 7–22)
CALCIUM SERPL-MCNC: 8.5 MG/DL (ref 8.5–10.5)
CHLORIDE SERPL-SCNC: 102 MEQ/L (ref 98–111)
CO2 SERPL-SCNC: 21 MEQ/L (ref 23–33)
CREAT SERPL-MCNC: 1 MG/DL (ref 0.4–1.2)
DEPRECATED RDW RBC AUTO: 54.2 FL (ref 35–45)
EOSINOPHIL NFR BLD AUTO: 1.7 %
EOSINOPHILS ABSOLUTE: 0.2 THOU/MM3 (ref 0–0.4)
ERYTHROCYTE [DISTWIDTH] IN BLOOD BY AUTOMATED COUNT: 17.9 % (ref 11.5–14.5)
GFR SERPL CREATININE-BSD FRML MDRD: > 60 ML/MIN/1.73M2
GLUCOSE SERPL-MCNC: 90 MG/DL (ref 70–108)
HCT VFR BLD AUTO: 25.3 % (ref 42–52)
HGB BLD-MCNC: 8.3 GM/DL (ref 14–18)
IMM GRANULOCYTES # BLD AUTO: 0.07 THOU/MM3 (ref 0–0.07)
IMM GRANULOCYTES NFR BLD AUTO: 0.6 %
LYMPHOCYTES ABSOLUTE: 2 THOU/MM3 (ref 1–4.8)
LYMPHOCYTES NFR BLD AUTO: 17.5 %
MCH RBC QN AUTO: 27.2 PG (ref 26–33)
MCHC RBC AUTO-ENTMCNC: 32.8 GM/DL (ref 32.2–35.5)
MCV RBC AUTO: 83 FL (ref 80–94)
MONOCYTES ABSOLUTE: 1.3 THOU/MM3 (ref 0.4–1.3)
MONOCYTES NFR BLD AUTO: 11 %
NEUTROPHILS NFR BLD AUTO: 68.7 %
NRBC BLD AUTO-RTO: 0 /100 WBC
PLATELET # BLD AUTO: 551 THOU/MM3 (ref 130–400)
PMV BLD AUTO: 9 FL (ref 9.4–12.4)
POTASSIUM SERPL-SCNC: 4.2 MEQ/L (ref 3.5–5.2)
RBC # BLD AUTO: 3.05 MILL/MM3 (ref 4.7–6.1)
SEGMENTED NEUTROPHILS ABSOLUTE COUNT: 7.8 THOU/MM3 (ref 1.8–7.7)
SODIUM SERPL-SCNC: 134 MEQ/L (ref 135–145)
WBC # BLD AUTO: 11.4 THOU/MM3 (ref 4.8–10.8)

## 2023-01-20 PROCEDURE — 99239 HOSP IP/OBS DSCHRG MGMT >30: CPT | Performed by: FAMILY MEDICINE

## 2023-01-20 PROCEDURE — 36415 COLL VENOUS BLD VENIPUNCTURE: CPT

## 2023-01-20 PROCEDURE — 6370000000 HC RX 637 (ALT 250 FOR IP): Performed by: PHYSICIAN ASSISTANT

## 2023-01-20 PROCEDURE — 85025 COMPLETE CBC W/AUTO DIFF WBC: CPT

## 2023-01-20 PROCEDURE — G0378 HOSPITAL OBSERVATION PER HR: HCPCS

## 2023-01-20 PROCEDURE — 2580000003 HC RX 258: Performed by: PHYSICIAN ASSISTANT

## 2023-01-20 PROCEDURE — 97535 SELF CARE MNGMENT TRAINING: CPT

## 2023-01-20 PROCEDURE — 80048 BASIC METABOLIC PNL TOTAL CA: CPT

## 2023-01-20 RX ORDER — LANOLIN ALCOHOL/MO/W.PET/CERES
325 CREAM (GRAM) TOPICAL EVERY OTHER DAY
Qty: 30 TABLET | Refills: 0 | Status: SHIPPED | OUTPATIENT
Start: 2023-01-20 | End: 2023-03-21

## 2023-01-20 RX ADMIN — BRIMONIDINE TARTRATE 1 DROP: 2 SOLUTION OPHTHALMIC at 08:53

## 2023-01-20 RX ADMIN — POLYETHYLENE GLYCOL 3350 17 G: 17 POWDER, FOR SOLUTION ORAL at 08:58

## 2023-01-20 RX ADMIN — PANTOPRAZOLE SODIUM 40 MG: 40 TABLET, DELAYED RELEASE ORAL at 06:40

## 2023-01-20 RX ADMIN — LISINOPRIL 20 MG: 20 TABLET ORAL at 08:52

## 2023-01-20 RX ADMIN — SODIUM CHLORIDE, PRESERVATIVE FREE 10 ML: 5 INJECTION INTRAVENOUS at 08:52

## 2023-01-20 ASSESSMENT — PAIN SCALES - GENERAL: PAINLEVEL_OUTOF10: 0

## 2023-01-20 NOTE — PLAN OF CARE
Problem: Discharge Planning  Goal: Discharge to home or other facility with appropriate resources  Outcome: Progressing  Flowsheets  Taken 1/19/2023 2142 by Ham Donaldson RN  Discharge to home or other facility with appropriate resources:   Identify barriers to discharge with patient and caregiver   Arrange for needed discharge resources and transportation as appropriate  Taken 1/19/2023 2045 by Bethel Perera RN  Discharge to home or other facility with appropriate resources:   Identify barriers to discharge with patient and caregiver   Arrange for needed discharge resources and transportation as appropriate   Identify discharge learning needs (meds, wound care, etc)   Refer to discharge planning if patient needs post-hospital services based on physician order or complex needs related to functional status, cognitive ability or social support system     Problem: Pain  Goal: Verbalizes/displays adequate comfort level or baseline comfort level  Outcome: Progressing  Flowsheets  Taken 1/19/2023 2142 by Ham Donaldson RN  Verbalizes/displays adequate comfort level or baseline comfort level:   Encourage patient to monitor pain and request assistance   Assess pain using appropriate pain scale   Administer analgesics based on type and severity of pain and evaluate response  Taken 1/19/2023 2045 by Bethel Perera RN  Verbalizes/displays adequate comfort level or baseline comfort level:   Encourage patient to monitor pain and request assistance   Assess pain using appropriate pain scale   Administer analgesics based on type and severity of pain and evaluate response   Implement non-pharmacological measures as appropriate and evaluate response   Notify Licensed Independent Practitioner if interventions unsuccessful or patient reports new pain   Consider cultural and social influences on pain and pain management     Problem: Safety - Adult  Goal: Free from fall injury  Outcome: Progressing  Flowsheets (Taken 1/19/2023 2142)  Free From Fall Injury: Instruct family/caregiver on patient safety     Problem: Hematologic - Adult  Goal: Maintains hematologic stability  Outcome: Progressing  Flowsheets  Taken 1/19/2023 2142 by Bill Oakes RN  Maintains hematologic stability:   Assess for signs and symptoms of bleeding or hemorrhage   Monitor labs for bleeding or clotting disorders   Administer blood products/factors as ordered  Taken 1/19/2023 2045 by Evita Corrales RN  Maintains hematologic stability:   Monitor labs for bleeding or clotting disorders   Administer blood products/factors as ordered   Assess for signs and symptoms of bleeding or hemorrhage     Problem: Respiratory - Adult  Goal: Achieves optimal ventilation and oxygenation  Outcome: Progressing  Flowsheets (Taken 1/19/2023 2142)  Achieves optimal ventilation and oxygenation:   Assess for changes in respiratory status   Assess for changes in mentation and behavior   Position to facilitate oxygenation and minimize respiratory effort     Problem: Skin/Tissue Integrity  Goal: Absence of new skin breakdown  Description: 1. Monitor for areas of redness and/or skin breakdown  2. Assess vascular access sites hourly  3. Every 4-6 hours minimum:  Change oxygen saturation probe site  4. Every 4-6 hours:  If on nasal continuous positive airway pressure, respiratory therapy assess nares and determine need for appliance change or resting period. Outcome: Progressing  Note: Patient remains free of new skin breakdown this shift.       Problem: Chronic Conditions and Co-morbidities  Goal: Patient's chronic conditions and co-morbidity symptoms are monitored and maintained or improved  Outcome: Progressing  Flowsheets  Taken 1/19/2023 2142 by Bill Oakes RN  Care Plan - Patient's Chronic Conditions and Co-Morbidity Symptoms are Monitored and Maintained or Improved:   Monitor and assess patient's chronic conditions and comorbid symptoms for stability, deterioration, or improvement Collaborate with multidisciplinary team to address chronic and comorbid conditions and prevent exacerbation or deterioration   Update acute care plan with appropriate goals if chronic or comorbid symptoms are exacerbated and prevent overall improvement and discharge  Taken 1/19/2023 2045 by Arely Anderson 34 - Patient's Chronic Conditions and Co-Morbidity Symptoms are Monitored and Maintained or Improved:   Monitor and assess patient's chronic conditions and comorbid symptoms for stability, deterioration, or improvement   Collaborate with multidisciplinary team to address chronic and comorbid conditions and prevent exacerbation or deterioration   Update acute care plan with appropriate goals if chronic or comorbid symptoms are exacerbated and prevent overall improvement and discharge     Care plan reviewed with patient. Patient verbalize understanding of the plan of care and contribute to goal setting.

## 2023-01-20 NOTE — DISCHARGE INSTRUCTIONS
[facility]. Will likely need GI/Hem follow up through [place].

## 2023-01-20 NOTE — DISCHARGE SUMMARY
DISCHARGE SUMMARY      Patient Identification:   Car Flaherty   : 1950  MRN: [id number]   Account: [de-identified]      Patient's PCP: RAY Whitt, MSN, APRN - CNP    Admit Date: 2023     Discharge Date:   2023    Admitting Physician: No admitting provider for patient encounter. Discharge Physician: Nasima Martinez DO     Discharge Diagnoses:    Severe Symptomatic Iron Deficiency Anemia  - Will continue with iron supplements at d/c  - To follow up in next few days with local [place] clinic, will likely need and would recommend GI/Hem workup, possible colonoscopy   Chronic Hyponatremia  Hypocalcemia  Leukocytosis  Thrombocytosis  Dyspnea  Essential Hypertension  Hyperlipidemia  Glaucoma  GERD  Generalized Weakness    The patient was seen and examined on day of discharge and this discharge summary is in conjunction with any daily progress note from day of discharge. Hospital Course:   Car Flaherty is a 67 y.o. male admitted to [address] [place] [address] on 2023 for shortness of breath. The patient states for approximately the last year he has become more fatigued and short of breath, primarily with exertion. Over the last few days, this has increased significantly. The patient consulted with his PCP who recommended he come to the emergency department for further evaluation. On arrival to the emergency department, his hemoglobin was noted to be 5.1. The patient has no history of anemia. He has no family history of any blood disorders or malignancy. He denies any overt signs of bleeding including hematuria, hematochezia, or melena. He eats a normal diet and has no history of iron deficiency. His last colonoscopy was 3 years ago and was noted to be normal.  He did have colon polyps on a prior colonoscopy approximately 3 years prior. He denies any chest pain, cough, abdominal pain, nausea, vomiting, or diarrhea. He does endorse some intermittent constipation.   No weight loss, fevers, chills, or night sweats. He does not use tobacco, but does report significant alcohol use, but states he has not drank since before the new year. Previously, he was drinking a small pint of liquor at a time. He does use marijuana. No occupational exposures to any heavy metals or chemicals. 1/19/2023 patient stable, has received a total of 3 units of PRBCs, 1 dose of IV iron. We will monitor H&H's and if remains stable will plan for discharge tomorrow with [facility]. .        1/20/2023 patient asymptomatic and at baseline. Understands that he needs to take iron supplements. Will follow with [facility] soon and will likely need GI/Hem workup. CBC ordered to complete [facility] to [facility]. Exam:     Vitals:  Vitals:    01/19/23 2045 01/19/23 2331 01/20/23 0330 01/20/23 0845   BP: 123/69 121/73 139/70 138/71   Pulse: 63 70 66 89   Resp: 18 18 19 18   Temp: 98.1 °F (36.7 °C) 97.9 °F (36.6 °C) 98.4 °F (36.9 °C) 98.7 °F (37.1 °C)   TempSrc: Oral Oral Oral Oral   SpO2: 100% 100% 98% 99%   Weight:       Height:         Weight: Weight: 148 lb 8 oz (67.4 kg)     24 hour intake/output:  Intake/Output Summary (Last 24 hours) at 1/20/2023 1043  Last data filed at 1/20/2023 1007  Gross per 24 hour   Intake 1250 ml   Output 2650 ml   Net -1400 ml         General appearance:  No apparent distress, appears stated age and cooperative. Pleasant   HEENT:  Normal cephalic, atraumatic without obvious deformity. Pupils equal, round, and reactive to light. Extra ocular muscles intact. Conjunctivae/corneas clear. Neck: Supple, with full range of motion. No jugular venous distention. Trachea midline. Respiratory:  Normal respiratory effort. Clear to auscultation, bilaterally without Rales/Wheezes/Rhonchi. Cardiovascular:  Regular rate and rhythm with normal S1/S2 without murmurs, rubs or gallops.   Abdomen: Soft, non-tender, non-distended with normal bowel sounds. Musculoskeletal:  No clubbing, cyanosis or edema bilaterally. Full range of motion without deformity. Skin: Skin color, texture, turgor normal.  No rashes or lesions. Neurologic:  Neurovascularly intact without any focal sensory/motor deficits. Cranial nerves: II-XII intact, grossly non-focal.  Psychiatric:  Alert and oriented, thought content appropriate, normal insight  Capillary Refill: Brisk,< 3 seconds   Peripheral Pulses: +2 palpable, equal bilaterally       Labs: For convenience and continuity at follow-up the following most recent labs are provided:      CBC:    Lab Results   Component Value Date/Time    WBC 11.4 01/20/2023 04:46 AM    HGB 8.3 01/20/2023 04:46 AM    HCT 25.3 01/20/2023 04:46 AM     01/20/2023 04:46 AM       Renal:    Lab Results   Component Value Date/Time     01/20/2023 04:46 AM    K 4.2 01/20/2023 04:46 AM     01/20/2023 04:46 AM    CO2 21 01/20/2023 04:46 AM    BUN 8 01/20/2023 04:46 AM    CREATININE 1.0 01/20/2023 04:46 AM    CALCIUM 8.5 01/20/2023 04:46 AM         Significant Diagnostic Studies    Radiology:   XR CHEST (2 VW)   Final Result   No acute abnormality identified. **This report has been created using voice recognition software. It may contain minor errors which are inherent in voice recognition technology. **      Final report electronically signed by Dr. Byron Rivera MD on 1/18/2023 3:14 PM             Consults:     IP CONSULT TO CASE MANAGEMENT  IP CONSULT TO SPIRITUAL SERVICES    Disposition:    [x] Home       [] TCU       [] Rehab       [] Psych       [] SNF       [] [place]       [] Other-    Condition at Discharge: Stable    Code Status:  Full Code     Patient Instructions:    Discharge lab work: CBC  Activity: activity as tolerated  Diet: ADULT DIET; Regular      Follow-up visits:   RADHIKA Gallegos - CNP  [address]  [address] [address]  [phone]    Follow up on 1/27/2023  staff-please s/u w/i 7d, your appointment time is at 1:00p, Please arrive 15mins early, Bring insurance card & Photo ID, co-pay, medication bottles & completed forms. [id number] [address] [address]  [phone]  Schedule an appointment as soon as possible for a visit in 3 day(s)  The Office will call to set up an appointment with the patient         Discharge Medications:        Medication List        START taking these medications      ferrous sulfate 325 (65 Fe) MG EC tablet  Commonly known as: Fe Tabs  Take 1 tablet by mouth every other day            CONTINUE taking these medications      naproxen 500 MG tablet  Commonly known as: NAPROSYN     omeprazole 20 MG delayed release capsule  Commonly known as: PRILOSEC     simvastatin 40 MG tablet  Commonly known as: ZOCOR               Where to Get Your Medications        These medications were sent to [facility] , [address] [address]      Phone: [phone]   ferrous sulfate 325 (65 Fe) MG EC tablet         Time Spent on discharge is more than 45 minutes in the examination, evaluation, counseling and review of medications and discharge plan. Signed: Thank you RAY Oscar, MSN, APRN - CNP for the opportunity to be involved in this patient's care.     Electronically signed by Elaine Landry DO on 1/20/2023 at 10:43 AM

## 2023-01-20 NOTE — PROGRESS NOTES
[facility] ICU STEPDOWN TELEMETRY 4K  Occupational Therapy  Daily Note  Time:    Time In: 8787  Time Out: 3607  Timed Code Treatment Minutes: 14 Minutes  Minutes: 14          Date: 2023  Patient Name: Alisson Lucero,   Gender: male      Room: [facility]/021-A  MRN: [id number]  : 1950  (67 y.o.)  Referring Practitioner: Caesar Wilson PA-C  Diagnosis: symptomatic anemia  Additional Pertinent Hx: Alisson Lucero is a 67 y.o. male with a history of colon polyps, essential hypertension, hyperlipidemia, glaucoma, and gastroesophageal reflux disease who [facility] with chief complaint of shortness of breath. The patient states for approximately the last year he has become more fatigued and short of breath, primarily with exertion. Over the last few days, this has increased significantly. The patient consulted with his PCP who recommended he come to the emergency department for further evaluation. On arrival to the emergency department, his hemoglobin was noted to be 5.1. The patient has no history of anemia. He has no family history of any blood disorders or malignancy. He denies any overt signs of bleeding including hematuria, hematochezia, or melena. He eats a normal diet and has no history of iron deficiency. His last colonoscopy was 3 years ago and was noted to be normal.  He did have colon polyps on a prior colonoscopy approximately 3 years prior. He denies any chest pain, cough, abdominal pain, nausea, vomiting, or diarrhea. He does endorse some intermittent constipation. No weight loss, fevers, chills, or night sweats. He does not use tobacco, but does report significant alcohol use, but states he has not drank since before the new year. Previously, he was drinking a small pint of liquor at a time. He does use marijuana. No occupational exposures to any heavy metals or chemicals.     Restrictions/Precautions:  Restrictions/Precautions: General Precautions  Position Activity Restriction  Other position/activity restrictions: monitor hemoglobin     SUBJECTIVE: RN okayed session, pt reported that he was prepping to go home and he was just waiting on his ride. Pt agreeable to OT assist in getting ready to go home. PAIN: Denies     Vitals: Vitals not assessed per clinical judgement, see nursing flowsheet    COGNITION: WFL    ADL:   Upper Extremity Dressing: Supervision. Donning pullovertshirt and buttonup while seated  Lower Extremity Dressing: Supervision. Threading BLE into pants while seated standing to manage around hips  Footwear Management: Supervision. Lisha Laughlin BALANCE:  Sitting Balance:  Independent. Standing Balance: Supervision. BED MOBILITY:  Supine to Sit: Modified Independent    Sit to Supine: Modified Independent    Scooting: Modified Independent      TRANSFERS:  Sit to Stand:  Supervision. From various surfaces  Stand to Sit: Supervision. To various surfaces    FUNCTIONAL MOBILITY:  Assistive Device: Straight Cane  Assist Level:  Supervision. Distance:  within room  Fair pace, no LOB     ADDITIONAL ACTIVITIES:  Pt educated on ECT and taking it easy upon going home to allow rebuilding of strength. Pt educated on importance of exercise, taking daily walks, and light strength training. Pt educated on various exercises that he can do, but also ECT that he can use to lessen strain. Pt very engaged and understanding with no questions at this time. ASSESSMENT:     Activity Tolerance:  Patient tolerance of  treatment: good. Discharge Recommendations: Home with assist as needed  Equipment Recommendations: Equipment Needed: No  Plan: Times Per Week: 3-5x  Times Per Day:  Once a day  Current Treatment Recommendations: Strengthening, Balance training, ROM, Safety education & training, Functional mobility training, Endurance training, Patient/Caregiver education & training, Equipment evaluation, education, & procurement, Self-Care / ADL    Patient Education  Patient Education: ADL's, Energy Conservation, and Reviewed Prior Education    Goals  Short Term Goals  Time Frame for Short Term Goals: by discharge  Short Term Goal 1: pt will complete functional mobility to/from bathroom and [facility] distances with supervision to access ADLs  Short Term Goal 2: pt will complete BADL routine with supervision to complete self care tasks  Short Term Goal 3: pt will complete dynamic standing x8 minutes with supervision to complete meal prep  Long Term Goals  Time Frame for Long Term Goals : no LTG est d/t short ELOS    Following session, patient left in safe position with all fall risk precautions in place.

## 2023-01-20 NOTE — CARE COORDINATION
1/20/23, 8:58 AM EST    Patient goals/plan/ treatment preferences discussed by  and . Patient goals/plan/ treatment preferences reviewed with patient/ family. Patient/ family verbalize understanding of discharge plan and are in agreement with goal/plan/treatment preferences. Understanding was demonstrated using the teach back method. AVS provided by RN at time of discharge, which includes all necessary medical information pertaining to the patients current course of illness, treatment, post-discharge goals of care, and treatment preferences.      Services At/After Discharge: None       IMM Letter  IMM Letter given to Patient/Family/Significant other/Guardian/POA/by[de-identified] patient access  IMM Letter date given[de-identified] 01/18/23  IMM Letter time given[de-identified] 94 [phone] 56

## 2023-01-20 NOTE — FLOWSHEET NOTE
01/20/23 1136   Safe Environment   Safety Measures Other (comment)  (Virtual nurse round complete)   Virtual nurse introduced via audio Patient permits camera. Patient in bed, awake. Discharge instructions reviewed with patient. Patient voiced understanding to prescription and self care at home. Did have a question regarding scheduled follow up. States he is not familiar with the provider. Did try to review patients chart and no information found. Did send message to bedside nurse via perfect service with patients question. Patient did voice understanding to calling the [place] and scheduling an appointment upon discharge. Patient also states he will contacting his nephew to let him know he is to be discharged. Message sent to bedside nurse with this information via perfect serve.

## 2023-01-20 NOTE — PROGRESS NOTES
Discharge teaching and instructions for diagnosis/procedure of Symptomatic anema completed with patient using teachback method. AVS reviewed. Meds to bed delivered to pt room. Patient voiced understanding regarding prescriptions, follow up appointments, and care of self at home.  Discharged in a wheelchair to  home with support per family